# Patient Record
Sex: MALE | Race: WHITE | Employment: FULL TIME | URBAN - METROPOLITAN AREA
[De-identification: names, ages, dates, MRNs, and addresses within clinical notes are randomized per-mention and may not be internally consistent; named-entity substitution may affect disease eponyms.]

---

## 2017-11-03 LAB
BASOPHILS ABSOLUTE: NORMAL /ΜL
BASOPHILS RELATIVE PERCENT: NORMAL %
EOSINOPHILS ABSOLUTE: NORMAL /ΜL
EOSINOPHILS RELATIVE PERCENT: NORMAL %
HCT VFR BLD CALC: NORMAL % (ref 41–53)
HEMOGLOBIN: NORMAL G/DL (ref 13.5–17.5)
LYMPHOCYTES ABSOLUTE: NORMAL /ΜL
LYMPHOCYTES RELATIVE PERCENT: NORMAL %
MCH RBC QN AUTO: NORMAL PG
MCHC RBC AUTO-ENTMCNC: NORMAL G/DL
MCV RBC AUTO: NORMAL FL
MONOCYTES ABSOLUTE: NORMAL /ΜL
MONOCYTES RELATIVE PERCENT: NORMAL %
NEUTROPHILS ABSOLUTE: NORMAL /ΜL
NEUTROPHILS RELATIVE PERCENT: NORMAL %
PLATELET # BLD: NORMAL K/ΜL
PMV BLD AUTO: NORMAL FL
RBC # BLD: NORMAL 10^6/ΜL
WBC # BLD: NORMAL 10^3/ML

## 2017-12-20 ENCOUNTER — TELEPHONE (OUTPATIENT)
Dept: INFECTIOUS DISEASES | Age: 52
End: 2017-12-20

## 2017-12-20 DIAGNOSIS — B20 HIV (HUMAN IMMUNODEFICIENCY VIRUS INFECTION) (HCC): Primary | ICD-10-CM

## 2017-12-20 NOTE — TELEPHONE ENCOUNTER
PATIENT CALLED AND NEEDS 4 MEDICATIONS REFILLED. HE HOWEVER DID NOT HAVE THE SIG FOR THEM. AND I CAN'T LOOK IN OLD E-SCRIBE ANYMORE. .. IF YOU COULD IN ORDER IN .       HE NEEDS:    INSENTRESS 400MG  NORVIR 100MG  PREZISTA 600MG   SELZENTRY 150MG    THANKS!!!!!

## 2017-12-21 RX ORDER — MARAVIROC 300 MG/1
300 TABLET, FILM COATED ORAL 2 TIMES DAILY
Qty: 60 TABLET | Refills: 5 | Status: SHIPPED | OUTPATIENT
Start: 2017-12-21 | End: 2017-12-22 | Stop reason: CLARIF

## 2017-12-22 RX ORDER — MARAVIROC 150 MG/1
150 TABLET, FILM COATED ORAL 2 TIMES DAILY
Qty: 180 TABLET | Refills: 1 | Status: SHIPPED | OUTPATIENT
Start: 2017-12-22 | End: 2018-10-10 | Stop reason: SDUPTHER

## 2017-12-22 NOTE — TELEPHONE ENCOUNTER
Freeman Arshad w/[facility] called, wrong RX was sent in. I cancelled the 300mg and pended the order for:       Selzentry 150mg, 1 tablet twice a day, #180, 1 refill    Please sign for the 150mg. Thank you.

## 2018-01-09 ENCOUNTER — OFFICE VISIT (OUTPATIENT)
Dept: INFECTIOUS DISEASES | Age: 53
End: 2018-01-09
Payer: COMMERCIAL

## 2018-01-09 VITALS
BODY MASS INDEX: 28.73 KG/M2 | WEIGHT: 178.8 LBS | SYSTOLIC BLOOD PRESSURE: 135 MMHG | HEIGHT: 66 IN | HEART RATE: 70 BPM | DIASTOLIC BLOOD PRESSURE: 73 MMHG | OXYGEN SATURATION: 95 % | RESPIRATION RATE: 16 BRPM

## 2018-01-09 DIAGNOSIS — B20 HIV (HUMAN IMMUNODEFICIENCY VIRUS INFECTION) (HCC): Primary | ICD-10-CM

## 2018-01-09 PROCEDURE — 99214 OFFICE O/P EST MOD 30 MIN: CPT | Performed by: INTERNAL MEDICINE

## 2018-01-09 RX ORDER — TRAZODONE HYDROCHLORIDE 100 MG/1
1 TABLET ORAL DAILY
COMMUNITY
Start: 2017-12-08

## 2018-01-09 RX ORDER — TADALAFIL 10 MG
1 TABLET ORAL PRN
COMMUNITY
Start: 2017-10-25

## 2018-01-09 RX ORDER — LISINOPRIL 10 MG/1
1 TABLET ORAL DAILY
COMMUNITY
Start: 2017-12-22

## 2018-01-09 RX ORDER — ACETAMINOPHEN AND CODEINE PHOSPHATE 300; 30 MG/1; MG/1
1-2 TABLET ORAL PRN
COMMUNITY
Start: 2017-05-24

## 2018-01-09 ASSESSMENT — ENCOUNTER SYMPTOMS
ABDOMINAL PAIN: 0
EYE DISCHARGE: 0
VOICE CHANGE: 0
ABDOMINAL DISTENTION: 0
NAUSEA: 0
FACIAL SWELLING: 0
RHINORRHEA: 0
SHORTNESS OF BREATH: 0
TROUBLE SWALLOWING: 0
BACK PAIN: 0
COUGH: 0
DIARRHEA: 0
SORE THROAT: 0

## 2018-01-09 NOTE — LETTER
[facility] [address] [place] [id number]  Phone: [phone]  Fax: [phone]    PCP: Niharika Santos MD   [address] [place] [address] providers:  Niharika Santos, [address] [facility]  [address] [place] [address] [address]: [phone]       January 18, 2018       Patient: Amalia Muñoz   MR Number: [id number]   YOB: 1965   Date of Visit: 1/9/2018     Dear Whitney Watts: Thank you for allowing me to see your patient Mr. Amalia Muñoz. Below are the relevant portions of my assessment and plan of care. [facility] INFECTIOUS DISEASES  Follow-Up Note      Patient's Name: Amalia Muñoz  YOB: 1965  Age/Sex: 45 y.o./ male  Physician: Jerrell Shukla MD  Date of Consult: 1/9/2018     MD Lele Goyal MD Michalene Retort, MD Chalmer Brash, MD     Assessment:     · HIV CD4 494 from 80 with negative viral load early last year  · Hemophilia A  · Hypertension  · Dyslipidemia  · Chronic kidney disease  · History of hepatitis C status post treatment  · ORIF right femur fracture and repeat surgery for infection - cured      Plan:     · Prezista, Norvir, Truvada, and Selzentry  · Regular blood draw  · CD4 and viral load this visit because of the decline in CD4  · Follow-up in May 2018          F/u of: HIV/Hep C     No chief complaint on file. Interval History:  Amalia Muñoz is a 52-year-old male with a medical history significant for treated Hep C and HIV on HAART. He has hemophilia A and acquired HIV through blood transfusion. He takes his medication regularly which consist of Prezista, Norvir, Truvada, and Selzentry. He had a viral blip in 2016 but on repeat monitoring it came back negative. He is to follow up today. He denies of any fever, chills or sweats. He continues to work. He denies major bleeding episodes. He denies any GI or  symptoms. His appetite is about the same and his energy level is good. November 13, 2017 done at [facility]  CD4 is 492  WBC 6.8 and hematocrit 13.8 hematocrit 43.5 and platelet count 614  We will obtain a viral load      IMAGING: None new    Thank you. Wilmer Stewart MD  1/9/2018  4:57 PM    [facility]    If you have questions, please do not hesitate to call me. I look forward to following Benny Camacho along with you.     Sincerely,    MD Wilmer Dumont MD

## 2018-01-09 NOTE — PROGRESS NOTES
Respiratory: Negative for cough and shortness of breath. Cardiovascular: Negative for chest pain, palpitations and leg swelling. Gastrointestinal: Negative for abdominal distention, abdominal pain, constipation, diarrhea and nausea. Genitourinary: Negative for dysuria, frequency and hematuria. Musculoskeletal: Positive for arthralgias. Negative for back pain, gait problem, joint swelling, myalgias, neck pain and neck stiffness. Occasional joint bleeding   Skin: Negative for color change, pallor, rash and wound. Allergic/Immunologic: Negative for environmental allergies, food allergies and immunocompromised state. Neurological: Negative for dizziness, tremors, seizures, syncope, facial asymmetry, speech difficulty, weakness, light-headedness, numbness and headaches. Hematological: Does not bruise/bleed easily. Psychiatric/Behavioral: Negative for agitation. Current Outpatient Prescriptions:     acetaminophen-codeine (TYLENOL #3) 300-30 MG per tablet, Take 1-2 tablets by mouth as needed. , Disp: , Rfl:     vitamin D 1000 units CAPS, Take 1,000 Units by mouth daily, Disp: , Rfl:     lisinopril (PRINIVIL;ZESTRIL) 10 MG tablet, Take 1 tablet by mouth daily, Disp: , Rfl:     traZODone (DESYREL) 100 MG tablet, Take 1 tablet by mouth daily, Disp: , Rfl:     CIALIS 10 MG tablet, Take 1 tablet by mouth as needed, Disp: , Rfl:     maraviroc (SELZENTRY) 150 MG tablet, Take 1 tablet by mouth 2 times daily, Disp: 180 tablet, Rfl: 1    darunavir ethanolate (PREZISTA) 800 MG TABS, Take 1 tablet by mouth daily, Disp: 30 tablet, Rfl: 5    ritonavir (NORVIR) 100 MG capsule, Take 1 capsule by mouth daily, Disp: 30 capsule, Rfl: 5    raltegravir (ISENTRESS) 400 MG tablet, Take 1 tablet by mouth 2 times daily, Disp: 60 tablet, Rfl: 3    Past Medical History:   Diagnosis Date    CKD (chronic kidney disease)     Dyslipidemia     Femur fracture (HCC)     Hemophilia (HCC)     Hepatitis C    

## 2018-01-18 ASSESSMENT — ENCOUNTER SYMPTOMS
COLOR CHANGE: 0
PHOTOPHOBIA: 0
SINUS PAIN: 0
CONSTIPATION: 0
SINUS PRESSURE: 0

## 2018-04-23 ENCOUNTER — TELEPHONE (OUTPATIENT)
Dept: INFECTIOUS DISEASES | Age: 53
End: 2018-04-23

## 2018-05-15 ENCOUNTER — OFFICE VISIT (OUTPATIENT)
Dept: INFECTIOUS DISEASES | Age: 53
End: 2018-05-15
Payer: COMMERCIAL

## 2018-05-15 VITALS
HEART RATE: 64 BPM | TEMPERATURE: 98.4 F | OXYGEN SATURATION: 94 % | WEIGHT: 178 LBS | SYSTOLIC BLOOD PRESSURE: 127 MMHG | DIASTOLIC BLOOD PRESSURE: 73 MMHG | BODY MASS INDEX: 28.61 KG/M2 | RESPIRATION RATE: 16 BRPM | HEIGHT: 66 IN

## 2018-05-15 DIAGNOSIS — B20 HIV (HUMAN IMMUNODEFICIENCY VIRUS INFECTION) (HCC): Primary | ICD-10-CM

## 2018-05-15 PROCEDURE — 99214 OFFICE O/P EST MOD 30 MIN: CPT | Performed by: INTERNAL MEDICINE

## 2018-05-15 ASSESSMENT — ENCOUNTER SYMPTOMS
NAUSEA: 0
FACIAL SWELLING: 0
BACK PAIN: 0
EYE DISCHARGE: 0
ABDOMINAL DISTENTION: 0
SORE THROAT: 0
VOICE CHANGE: 0
SHORTNESS OF BREATH: 0
COUGH: 0
DIARRHEA: 0
RHINORRHEA: 0
ABDOMINAL PAIN: 0
TROUBLE SWALLOWING: 0

## 2018-05-15 NOTE — LETTER
[facility] [address] [place] [address]  Phone: [phone]  Fax: [phone]    PCP: Maryam Hernandez MD   [address] [place] [address] providers:  Maryam Hernandez, [address] [facility]  [address] [place] [address] [address]: [phone]       July 26, 2018       Patient: Naty Wallace   MR Number: [id number]   YOB: 1965   Date of Visit: 5/15/2018     Dear Emile Montes: Thank you for allowing me to see your patient Mr. Naty Wallace. Below are the relevant portions of my assessment and plan of care. [facility] INFECTIOUS DISEASES  Follow-Up Note      Patient's Name: Naty Wallace  YOB: 1965  Age/Sex: 45 y.o./ male  Physician: Melina Valverde MD  Date of Consult: 5/15/2018          Assessment:     · HIV CD4 421 from 494 from 80 with negative viral load . Last check was in April 20, 2018. · Hemophilia Ano major bleeding episodes. He continues to receive his factor VIII  · Hypertension not under control  · Dyslipidemia  · Chronic kidney disease is relatively stable  · History of hepatitis C status post treatment  · ORIF right femur fracture and repeat surgery for infection - cured      Plan:     · Prezista, Norvir, Truvada, and Selzentry  · Regular blood draw  · And severe decline in his CD4 but he has a viral load that is undetectable. · Unclear as to the etiology of his CD4 decline because his symptoms to be doing well and his viral load is undetectable.             F/u of : HIV and hepatitic C    Chief Complaint   Patient presents with    HIV Positive/AIDS     follow up        HISTORY OF PRESENT ILLNESS:  Naty Wallace is a 73-year-old male with a medical history significant for treated Hep C and HIV on HAART. He has hemophilia A and acquired HIV through blood transfusion. He takes his medication regularly which consist of Prezista, Norvir, Truvada, and Selzentry.   He had a viral blip in 2016 but on repeat monitoring it came back negative. INTERVAL HISTORY:  The patient is here for follow-up today. He said he is doing well. He denies having any fever,  chills or sweats. He continues to work. He denies major bleeding episodes. He denies any GI or  symptoms. His appetite is about the same and his energy level is good. His weight is about the same. His factor VIII transfusions twice a week. Subjective:     Review of Systems   Constitutional: Negative for activity change, chills and fever. HENT: Negative for congestion, facial swelling, hearing loss, rhinorrhea, sore throat, trouble swallowing and voice change. Eyes: Negative for discharge and visual disturbance. Respiratory: Negative for cough and shortness of breath. Cardiovascular: Negative for chest pain and leg swelling. Gastrointestinal: Negative for abdominal distention, abdominal pain, diarrhea and nausea. Genitourinary: Negative for dysuria, frequency and hematuria. Musculoskeletal: Negative for arthralgias, back pain, gait problem, joint swelling, myalgias, neck pain and neck stiffness. Allergic/Immunologic: Negative for environmental allergies and food allergies. Neurological: Negative for dizziness, weakness, numbness and headaches. Hematological: Does not bruise/bleed easily. Current Outpatient Prescriptions:     acetaminophen-codeine (TYLENOL #3) 300-30 MG per tablet, Take 1-2 tablets by mouth as needed. , Disp: , Rfl:     vitamin D 1000 units CAPS, Take 1,000 Units by mouth daily, Disp: , Rfl:     lisinopril (PRINIVIL;ZESTRIL) 10 MG tablet, Take 1 tablet by mouth daily, Disp: , Rfl:     traZODone (DESYREL) 100 MG tablet, Take 1 tablet by mouth daily, Disp: , Rfl:     CIALIS 10 MG tablet, Take 1 tablet by mouth as needed, Disp: , Rfl:     maraviroc (SELZENTRY) 150 MG tablet, Take 1 tablet by mouth 2 times daily, Disp: 180 tablet, Rfl: 1   darunavir ethanolate (PREZISTA) 800 MG TABS, Take 1 tablet by mouth daily, Disp: 30 tablet, Rfl: 5    ritonavir (NORVIR) 100 MG capsule, Take 1 capsule by mouth daily, Disp: 30 capsule, Rfl: 5    raltegravir (ISENTRESS) 400 MG tablet, Take 1 tablet by mouth 2 times daily, Disp: 60 tablet, Rfl: 3    Past Medical History:   Diagnosis Date    CKD (chronic kidney disease)     Dyslipidemia     Femur fracture (HCC)     [facility] ([facility]     Hepatitis C     HIV (human immunodeficiency virus infection) ([facility]     Hypertension        Past Surgical History:   Procedure Laterality Date    FEMUR FRACTURE SURGERY      Right        Allergies   Allergen Reactions    Aspirin     Ceclor [Cefaclor]     Nsaids     Paxil [Paroxetine]        History reviewed. No pertinent family history. Social History     Social History    Marital status:      Spouse name: N/A    Number of children: N/A    Years of education: N/A     Social History Main Topics    Smoking status: Never Smoker    Smokeless tobacco: Never Used    Alcohol use No    Drug use: No    Sexual activity: Not Asked     Other Topics Concern    None     Social History Narrative    None         Objective:   /73 (Site: Left Arm, Position: Sitting, Cuff Size: Large Adult)   Pulse 64   Temp 98.4 °F (36.9 °C) (Oral)   Resp 16   Ht 5' 6\" (1.676 m)   Wt 178 lb (80.7 kg)   SpO2 94%   BMI 28.73 kg/m²    Physical Exam   Constitutional: He is oriented to person, place, and time. He appears well-developed and well-nourished. HENT:   Head: Normocephalic and atraumatic. Right Ear: External ear normal.   Left Ear: External ear normal.   Nose: Nose normal.   Mouth/Throat: Oropharynx is clear and moist. No oropharyngeal exudate. Eyes: Conjunctivae and EOM are normal. Pupils are equal, round, and reactive to light. Right eye exhibits no discharge. Left eye exhibits no discharge. No scleral icterus.

## 2018-05-15 NOTE — PROGRESS NOTES
sore throat, trouble swallowing and voice change. Eyes: Negative for discharge and visual disturbance. Respiratory: Negative for cough and shortness of breath. Cardiovascular: Negative for chest pain and leg swelling. Gastrointestinal: Negative for abdominal distention, abdominal pain, diarrhea and nausea. Genitourinary: Negative for dysuria, frequency and hematuria. Musculoskeletal: Negative for arthralgias, back pain, gait problem, joint swelling, myalgias, neck pain and neck stiffness. Allergic/Immunologic: Negative for environmental allergies and food allergies. Neurological: Negative for dizziness, weakness, numbness and headaches. Hematological: Does not bruise/bleed easily. Current Outpatient Prescriptions:     acetaminophen-codeine (TYLENOL #3) 300-30 MG per tablet, Take 1-2 tablets by mouth as needed. , Disp: , Rfl:     vitamin D 1000 units CAPS, Take 1,000 Units by mouth daily, Disp: , Rfl:     lisinopril (PRINIVIL;ZESTRIL) 10 MG tablet, Take 1 tablet by mouth daily, Disp: , Rfl:     traZODone (DESYREL) 100 MG tablet, Take 1 tablet by mouth daily, Disp: , Rfl:     CIALIS 10 MG tablet, Take 1 tablet by mouth as needed, Disp: , Rfl:     maraviroc (SELZENTRY) 150 MG tablet, Take 1 tablet by mouth 2 times daily, Disp: 180 tablet, Rfl: 1    darunavir ethanolate (PREZISTA) 800 MG TABS, Take 1 tablet by mouth daily, Disp: 30 tablet, Rfl: 5    ritonavir (NORVIR) 100 MG capsule, Take 1 capsule by mouth daily, Disp: 30 capsule, Rfl: 5    raltegravir (ISENTRESS) 400 MG tablet, Take 1 tablet by mouth 2 times daily, Disp: 60 tablet, Rfl: 3    Past Medical History:   Diagnosis Date    CKD (chronic kidney disease)     Dyslipidemia     Femur fracture (HCC)     [facility]     Hepatitis C     HIV (human immunodeficiency virus infection) ([facility]     Hypertension        Past Surgical History:   Procedure Laterality Date    FEMUR FRACTURE SURGERY      Right        Allergies Allergen Reactions    Aspirin     Ceclor [Cefaclor]     Nsaids     Paxil [Paroxetine]        History reviewed. No pertinent family history. Social History     Social History    Marital status:      Spouse name: N/A    Number of children: N/A    Years of education: N/A     Social History Main Topics    Smoking status: Never Smoker    Smokeless tobacco: Never Used    Alcohol use No    Drug use: No    Sexual activity: Not Asked     Other Topics Concern    None     Social History Narrative    None         Objective:   /73 (Site: Left Arm, Position: Sitting, Cuff Size: Large Adult)   Pulse 64   Temp 98.4 °F (36.9 °C) (Oral)   Resp 16   Ht 5' 6\" (1.676 m)   Wt 178 lb (80.7 kg)   SpO2 94%   BMI 28.73 kg/m²   Physical Exam   Constitutional: He is oriented to person, place, and time. He appears well-developed and well-nourished. HENT:   Head: Normocephalic and atraumatic. Right Ear: External ear normal.   Left Ear: External ear normal.   Nose: Nose normal.   Mouth/Throat: Oropharynx is clear and moist. No oropharyngeal exudate. Eyes: Conjunctivae and EOM are normal. Pupils are equal, round, and reactive to light. Right eye exhibits no discharge. Left eye exhibits no discharge. No scleral icterus. Neck: Neck supple. No JVD present. No tracheal deviation present. Cardiovascular: Normal rate, regular rhythm, normal heart sounds and intact distal pulses. No murmur heard. Pulmonary/Chest: Effort normal and breath sounds normal. No stridor. He has no wheezes. He has no rales. Abdominal: Soft. Bowel sounds are normal. He exhibits no distension. There is no tenderness. Musculoskeletal: He exhibits no edema. Bony prominences are stable related to osteoarthritis. The patient does have some degree of atrophy of the lower extremities. Lymphadenopathy:     He has no cervical adenopathy. Neurological: He is alert and oriented to person, place, and time.  He has normal

## 2018-10-10 NOTE — TELEPHONE ENCOUNTER
Dr Michelle Neville, patient is current but not scheduled for an appointment at this time due to you not being in the office. I sent a note to pharmacy asking that patient call for an appointment. Per your last dictation, Prezista, Norvir, Truvada and Selzentry. No mention of Isentress but you have prescribed in the past.     Please sign for refills that are appropriate. Thank you.

## 2018-10-19 RX ORDER — MARAVIROC 150 MG
TABLET ORAL
Qty: 180 TABLET | Refills: 0 | Status: SHIPPED | OUTPATIENT
Start: 2018-10-19 | End: 2021-02-24 | Stop reason: ALTCHOICE

## 2018-10-19 RX ORDER — RALTEGRAVIR 400 MG/1
TABLET, FILM COATED ORAL
Qty: 180 TABLET | Refills: 0 | Status: SHIPPED | OUTPATIENT
Start: 2018-10-19

## 2018-10-19 RX ORDER — DARUNAVIR 800 MG/1
TABLET, FILM COATED ORAL
Qty: 90 TABLET | Refills: 0 | Status: SHIPPED | OUTPATIENT
Start: 2018-10-19 | End: 2019-12-18

## 2018-11-07 RX ORDER — RITONAVIR 100 MG/1
TABLET ORAL
Qty: 90 TABLET | Refills: 3 | Status: SHIPPED | OUTPATIENT
Start: 2018-11-07 | End: 2019-09-19 | Stop reason: SDUPTHER

## 2018-12-19 ENCOUNTER — OFFICE VISIT (OUTPATIENT)
Dept: INFECTIOUS DISEASES | Age: 53
End: 2018-12-19
Payer: COMMERCIAL

## 2018-12-19 VITALS
RESPIRATION RATE: 14 BRPM | HEIGHT: 66 IN | TEMPERATURE: 98.1 F | OXYGEN SATURATION: 97 % | WEIGHT: 177 LBS | DIASTOLIC BLOOD PRESSURE: 81 MMHG | HEART RATE: 72 BPM | SYSTOLIC BLOOD PRESSURE: 128 MMHG | BODY MASS INDEX: 28.45 KG/M2

## 2018-12-19 DIAGNOSIS — Z86.19 HX OF HEPATITIS C: ICD-10-CM

## 2018-12-19 DIAGNOSIS — B20 HIV (HUMAN IMMUNODEFICIENCY VIRUS INFECTION) (HCC): Primary | ICD-10-CM

## 2018-12-19 PROCEDURE — 99214 OFFICE O/P EST MOD 30 MIN: CPT | Performed by: INTERNAL MEDICINE

## 2018-12-19 RX ORDER — RITONAVIR 100 MG/1
100 TABLET ORAL DAILY
Qty: 90 EACH | Refills: 4 | Status: SHIPPED | OUTPATIENT
Start: 2018-12-19 | End: 2019-09-05 | Stop reason: SDUPTHER

## 2018-12-19 RX ORDER — MARAVIROC 150 MG/1
150 TABLET, FILM COATED ORAL 2 TIMES DAILY
Qty: 180 TABLET | Refills: 4 | Status: SHIPPED | OUTPATIENT
Start: 2018-12-19 | End: 2019-09-05 | Stop reason: SDUPTHER

## 2018-12-19 RX ORDER — EMTRICITABINE AND TENOFOVIR DISOPROXIL FUMARATE 200; 300 MG/1; MG/1
1 TABLET, FILM COATED ORAL DAILY
Qty: 90 TABLET | Refills: 4 | Status: SHIPPED | OUTPATIENT
Start: 2018-12-19 | End: 2021-02-24 | Stop reason: ALTCHOICE

## 2018-12-19 ASSESSMENT — ENCOUNTER SYMPTOMS
RESPIRATORY NEGATIVE: 1
ALLERGIC/IMMUNOLOGIC NEGATIVE: 1
GASTROINTESTINAL NEGATIVE: 1
EYES NEGATIVE: 1

## 2018-12-19 NOTE — PROGRESS NOTES
affairs, no history of STDs,  Blood work reviewed within normal range, CD4 count has increased back to 641 with 32% and viral load negative, complete metabolic profile and CBC within normal range. I have personally reviewed the past medical history, past surgical history, medications, social history, and family history, and I haveupdated the database accordingly. Past Medical History:     Past Medical History:   Diagnosis Date    CKD (chronic kidney disease)     Dyslipidemia     Femur fracture (HCC)     [facility]     Hepatitis C     [facility]     Hypertension        Past Surgical  History:     Past Surgical History:   Procedure Laterality Date    FEMUR FRACTURE SURGERY      Right        Medications:     Current Outpatient Prescriptions:     darunavir ethanolate (PREZISTA) 800 MG TABS, Take 1 tablet by mouth daily, Disp: 90 tablet, Rfl: 4    ritonavir (NORVIR) 100 MG tablet, Take 1 tablet by mouth daily, Disp: 90 each, Rfl: 4    emtricitabine-tenofovir (TRUVADA) 200-300 MG per tablet, Take 1 tablet by mouth daily, Disp: 90 tablet, Rfl: 4    maraviroc (SELZENTRY) 150 MG tablet, Take 1 tablet by mouth 2 times daily, Disp: 180 tablet, Rfl: 4    ritonavir (NORVIR) 100 MG tablet, TAKE 1 TABLET DAILY, Disp: 90 tablet, Rfl: 3    PREZISTA 800 MG TABS, TAKE 1 TABLET DAILY, Disp: 90 tablet, Rfl: 0    SELZENTRY 150 MG tablet, TAKE 1 TABLET TWICE A DAY, Disp: 180 tablet, Rfl: 0    ISENTRESS 400 MG tablet, TAKE 1 TABLET TWICE A DAY, Disp: 180 tablet, Rfl: 0    acetaminophen-codeine (TYLENOL #3) 300-30 MG per tablet, Take 1-2 tablets by mouth as needed. , Disp: , Rfl:     vitamin D 1000 units CAPS, Take 1,000 Units by mouth daily, Disp: , Rfl:     lisinopril (PRINIVIL;ZESTRIL) 10 MG tablet, Take 1 tablet by mouth daily, Disp: , Rfl:     traZODone (DESYREL) 100 MG tablet, Take 1 tablet by mouth daily, Disp: , Rfl:     CIALIS 10 MG tablet, Take 1 tablet by

## 2019-09-05 DIAGNOSIS — B20 HIV (HUMAN IMMUNODEFICIENCY VIRUS INFECTION) (HCC): ICD-10-CM

## 2019-09-05 LAB
ALBUMIN SERPL-MCNC: NORMAL G/DL
ALP BLD-CCNC: NORMAL U/L
ALT SERPL-CCNC: NORMAL U/L
ANION GAP SERPL CALCULATED.3IONS-SCNC: NORMAL MMOL/L
AST SERPL-CCNC: NORMAL U/L
BILIRUB SERPL-MCNC: NORMAL MG/DL (ref 0.1–1.4)
BUN BLDV-MCNC: NORMAL MG/DL
CALCIUM SERPL-MCNC: NORMAL MG/DL
CHLORIDE BLD-SCNC: NORMAL MMOL/L
CHOLESTEROL, FASTING: NORMAL
CO2: NORMAL MMOL/L
CREAT SERPL-MCNC: NORMAL MG/DL
GFR CALCULATED: NORMAL
GLUCOSE BLD-MCNC: NORMAL MG/DL
HDLC SERPL-MCNC: NORMAL MG/DL (ref 35–70)
LDL CHOLESTEROL CALCULATED: NORMAL MG/DL (ref 0–160)
LIPASE: NORMAL UNITS/L
POTASSIUM SERPL-SCNC: NORMAL MMOL/L
SODIUM BLD-SCNC: NORMAL MMOL/L
TOTAL PROTEIN: NORMAL
TRIGLYCERIDE, FASTING: NORMAL

## 2019-09-05 NOTE — TELEPHONE ENCOUNTER
Patient is requesting refills of his medications, all but Truvada. He has plenty of that. Chente Nesbitt He only has 5 days worth left. I verified the new pharmacy. Please sign off on them.  Thank you

## 2019-09-10 RX ORDER — RITONAVIR 100 MG/1
100 TABLET ORAL DAILY
Qty: 90 EACH | Refills: 3 | Status: SHIPPED | OUTPATIENT
Start: 2019-09-10 | End: 2019-12-18 | Stop reason: SDUPTHER

## 2019-09-10 RX ORDER — MARAVIROC 150 MG/1
150 TABLET, FILM COATED ORAL 2 TIMES DAILY
Qty: 180 TABLET | Refills: 3 | Status: SHIPPED | OUTPATIENT
Start: 2019-09-10 | End: 2019-12-18 | Stop reason: SDUPTHER

## 2019-09-11 DIAGNOSIS — Z86.19 HX OF HEPATITIS C: ICD-10-CM

## 2019-09-11 DIAGNOSIS — B20 HIV (HUMAN IMMUNODEFICIENCY VIRUS INFECTION) (HCC): ICD-10-CM

## 2019-09-12 NOTE — TELEPHONE ENCOUNTER
Patient called and said the pharmacy didn't receive our scripts so I phoned them in. They will call the patient and put a rush on the medications.

## 2019-09-14 DIAGNOSIS — R79.89 BLOOD TRIGLYCERIDES INCREASED COMPARED WITH PRIOR MEASUREMENT: Primary | ICD-10-CM

## 2019-09-19 ENCOUNTER — OFFICE VISIT (OUTPATIENT)
Dept: INFECTIOUS DISEASES | Age: 54
End: 2019-09-19
Payer: COMMERCIAL

## 2019-09-19 VITALS
HEART RATE: 73 BPM | TEMPERATURE: 97.6 F | BODY MASS INDEX: 27.83 KG/M2 | WEIGHT: 173.2 LBS | SYSTOLIC BLOOD PRESSURE: 135 MMHG | OXYGEN SATURATION: 96 % | DIASTOLIC BLOOD PRESSURE: 80 MMHG | HEIGHT: 66 IN

## 2019-09-19 DIAGNOSIS — Z21 HIV INFECTION, ASYMPTOMATIC (HCC): Primary | ICD-10-CM

## 2019-09-19 DIAGNOSIS — E78.1 HIGH TRIGLYCERIDES: ICD-10-CM

## 2019-09-19 DIAGNOSIS — R79.89 BLOOD TRIGLYCERIDES INCREASED COMPARED WITH PRIOR MEASUREMENT: ICD-10-CM

## 2019-09-19 PROCEDURE — 99214 OFFICE O/P EST MOD 30 MIN: CPT | Performed by: INTERNAL MEDICINE

## 2019-09-19 ASSESSMENT — ENCOUNTER SYMPTOMS
DIARRHEA: 0
GASTROINTESTINAL NEGATIVE: 1
CHOKING: 0
RESPIRATORY NEGATIVE: 1
EYES NEGATIVE: 1
ABDOMINAL PAIN: 0
ALLERGIC/IMMUNOLOGIC NEGATIVE: 1

## 2019-09-19 NOTE — PROGRESS NOTES
BILITOT, ALKPHOS, ALT, AST  No results found for: RPR  No results found for: HIV  No results found for: BC  No results found for: EPICELLS, MUCUS, PH, RBC, TRICHOMONAS, WBC, YEAST, TURBIDITY  No results found for: CREATININE, GLUCOSE, KETONES  Thank you for allowing us to participate in the care of this patient. Please call with questions. Georganne Olszewski, MD  - Office: [phone]    Please note that this chart was generated using voice recognition Dragon dictation software. Although every effort was made to ensure the accuracy of this automated transcription, some errors in transcription mayhave occurred.

## 2019-11-25 DIAGNOSIS — Z21 HIV INFECTION, ASYMPTOMATIC (HCC): Primary | ICD-10-CM

## 2019-12-07 LAB
CHOLESTEROL, TOTAL: NORMAL
CHOLESTEROL/HDL RATIO: NORMAL
HDLC SERPL-MCNC: NORMAL MG/DL
LDL CHOLESTEROL CALCULATED: NORMAL
TRIGL SERPL-MCNC: NORMAL MG/DL
VLDLC SERPL CALC-MCNC: NORMAL MG/DL

## 2019-12-13 ENCOUNTER — TELEPHONE (OUTPATIENT)
Dept: INFECTIOUS DISEASES | Age: 54
End: 2019-12-13

## 2019-12-13 DIAGNOSIS — Z21 HIV INFECTION, ASYMPTOMATIC (HCC): ICD-10-CM

## 2019-12-16 DIAGNOSIS — Z21 HIV INFECTION, ASYMPTOMATIC (HCC): ICD-10-CM

## 2019-12-18 ENCOUNTER — OFFICE VISIT (OUTPATIENT)
Dept: INFECTIOUS DISEASES | Age: 54
End: 2019-12-18
Payer: COMMERCIAL

## 2019-12-18 VITALS
SYSTOLIC BLOOD PRESSURE: 131 MMHG | WEIGHT: 178.9 LBS | OXYGEN SATURATION: 99 % | TEMPERATURE: 97.8 F | HEART RATE: 87 BPM | DIASTOLIC BLOOD PRESSURE: 68 MMHG | BODY MASS INDEX: 28.88 KG/M2

## 2019-12-18 DIAGNOSIS — Z21 ASYMPTOMATIC HIV INFECTION (HCC): Primary | ICD-10-CM

## 2019-12-18 PROCEDURE — 99214 OFFICE O/P EST MOD 30 MIN: CPT | Performed by: INTERNAL MEDICINE

## 2019-12-18 ASSESSMENT — ENCOUNTER SYMPTOMS
CHOKING: 0
DIARRHEA: 0
RESPIRATORY NEGATIVE: 1
ABDOMINAL PAIN: 0
EYES NEGATIVE: 1
GASTROINTESTINAL NEGATIVE: 1
ALLERGIC/IMMUNOLOGIC NEGATIVE: 1

## 2020-01-14 ENCOUNTER — TELEPHONE (OUTPATIENT)
Dept: INFECTIOUS DISEASES | Age: 55
End: 2020-01-14

## 2020-01-17 NOTE — TELEPHONE ENCOUNTER
Attempted to call patient yesterday and LMOM, I also called patient again this morning.  I will attempt to reach Emergency Contact this afternoon

## 2020-03-17 RX ORDER — DARUNAVIR 800 MG/1
TABLET, FILM COATED ORAL
Qty: 90 TABLET | Refills: 1 | Status: SHIPPED | OUTPATIENT
Start: 2020-03-17 | End: 2020-09-08

## 2020-06-24 ENCOUNTER — OFFICE VISIT (OUTPATIENT)
Dept: INFECTIOUS DISEASES | Age: 55
End: 2020-06-24
Payer: COMMERCIAL

## 2020-06-24 VITALS
RESPIRATION RATE: 18 BRPM | HEART RATE: 96 BPM | BODY MASS INDEX: 28.03 KG/M2 | DIASTOLIC BLOOD PRESSURE: 77 MMHG | SYSTOLIC BLOOD PRESSURE: 125 MMHG | TEMPERATURE: 97.9 F | WEIGHT: 174.4 LBS | HEIGHT: 66 IN | OXYGEN SATURATION: 98 %

## 2020-06-24 PROCEDURE — 99213 OFFICE O/P EST LOW 20 MIN: CPT | Performed by: INTERNAL MEDICINE

## 2020-06-24 PROCEDURE — 90471 IMMUNIZATION ADMIN: CPT | Performed by: INTERNAL MEDICINE

## 2020-06-24 PROCEDURE — 90670 PCV13 VACCINE IM: CPT | Performed by: INTERNAL MEDICINE

## 2020-06-24 ASSESSMENT — ENCOUNTER SYMPTOMS
CHOKING: 0
ABDOMINAL PAIN: 0
GASTROINTESTINAL NEGATIVE: 1
RESPIRATORY NEGATIVE: 1
DIARRHEA: 0
EYES NEGATIVE: 1
ALLERGIC/IMMUNOLOGIC NEGATIVE: 1

## 2020-06-24 NOTE — PATIENT INSTRUCTIONS
We gave you today 6/24 the Prevnar 13  You will be due to take the tetanus vaccine for adult DTaP at your local pharmacy  You will need otherwise Pneumovax 23 every 5 to 7 years probably due in about 2021

## 2020-06-24 NOTE — PROGRESS NOTES
CBC With Auto Differential    Lipase    PREVNAR 13 IM (Pneumococcal conjugate vaccine 13-valent)       Return in about 8 months (around 2/24/2021). History of Present Illness:   Glenn Bailey is a 62y.o.-year-old  male who presents with   Chief Complaint   Patient presents with    HIV   Visit of 12/19/18  This is a gentleman who has been following with Dr. Latosha Paredes for HIV 1, for many years now, has had hemophilia A, on factor VIII, had multiple transfusions since he was young, leading to the HIV superinfection, hepatitis C, post treatment with viral load negative since 2014, CK-MB, stable, his CD4 count has been within a 400-600 range on boosted darunavir and Truvada regimen with maraviroc. He did have a right femoral fracture in 2010, leading to  bleed and having to remove the hardware then replacing it for MRSA infection. Currently he is not o any suppressive therapy, his right thigh is atrophic, but she can ambulate. He is , has no other extramarital sexual affairs, no history of STDs,  Blood work reviewed within normal range, CD4 count has increased back to 641 with 32% and viral load negative, complete metabolic profile and CBC within normal range. Visit 9/19/19  Feels great and no fever - no chest pain or diarrhea - no etoh and no risky sex exposure -  -  Hep C neg PCR AND HIV VL not detected, CD4 lower at 390 and 27%. trigleride is400 range and card risk 6.6, elevated w low HDL. Per pt he had a higher triglyceride in past on other HAART, never been on anti lipid meds. Taking his meds regularly and no skipping. Had some joint small bleeds, following w ortho. Exam neg     Visit 6/24/20  Doing great today, he still has on and off bleeds in his right elbow and both ankles from his hemophilia, and is planning to go on Hemlibra is a new medication for that. Edel Cuellarmalgorzata is actually [place], I looked her up and he does not list TB as a side effect.   Hence will defer 300-30 MG per tablet, Take 1-2 tablets by mouth as needed. , Disp: , Rfl:     vitamin D 1000 units CAPS, Take 1,000 Units by mouth daily, Disp: , Rfl:     lisinopril (PRINIVIL;ZESTRIL) 10 MG tablet, Take 1 tablet by mouth daily, Disp: , Rfl:     traZODone (DESYREL) 100 MG tablet, Take 1 tablet by mouth daily, Disp: , Rfl:     CIALIS 10 MG tablet, Take 1 tablet by mouth as needed, Disp: , Rfl:     ritonavir (NORVIR) 100 MG capsule, Take 1 capsule by mouth daily, Disp: 90 capsule, Rfl: 0    emtricitabine-tenofovir (TRUVADA) 200-300 MG per tablet, Take 1 tablet by mouth daily, Disp: 90 tablet, Rfl: 4      Social History:     Social History     Socioeconomic History    Marital status:      Spouse name: Not on file    Number of children: Not on file    Years of education: Not on file    Highest education level: Not on file   Occupational History    Not on file   Social Needs    Financial resource strain: Not on file    Food insecurity     Worry: Not on file     Inability: Not on file    Transportation needs     Medical: Not on file     Non-medical: Not on file   Tobacco Use    Smoking status: Never Smoker    Smokeless tobacco: Never Used   Substance and Sexual Activity    Alcohol use: No    Drug use: No    Sexual activity: Not on file   Lifestyle    Physical activity     Days per week: Not on file     Minutes per session: Not on file    Stress: Not on file   Relationships    Social connections     Talks on phone: Not on file     Gets together: Not on file     Attends [employer] service: Not on file     Active member of club or organization: Not on file     Attends meetings of clubs or organizations: Not on file     Relationship status: Not on file    Intimate partner violence     Fear of current or ex partner: Not on file     Emotionally abused: Not on file     Physically abused: Not on file     Forced sexual activity: Not on file   Other Topics Concern    Not on file   Social History

## 2020-07-16 RX ORDER — RITONAVIR 100 MG/1
TABLET ORAL
Qty: 30 TABLET | Refills: 4 | Status: SHIPPED | OUTPATIENT
Start: 2020-07-16 | End: 2021-01-06

## 2020-07-16 NOTE — TELEPHONE ENCOUNTER
A request for Norvir is coming over from pharmacy. Patient is current with you. I am not sure if pt is to be on this or not. Dictation from last visit is not clear. Please sign if you agree pt is to be on this medication or make any changes necessary.

## 2020-08-04 NOTE — TELEPHONE ENCOUNTER
Pt called office requesting refill on Selzentry and Isentress - per your dictation:   Future options are:  symtuza + isentress and maraviroc - will start trying this option  Or   Ritonavir/ darunavir + descovy( instead of truvada) +  isentress and maraviroc    Pt said he is no longer taking Truvada. Not sure what strength you want.   Please send to [facility] in [place]

## 2020-08-06 NOTE — TELEPHONE ENCOUNTER
8/6/2020 9:06 AM Pls ask him and refill what he is on only. Ty       off- called Rx he requested into [facility] - gave verbal to [facility].      Dr Luther Stonewall- please sign pending Rx

## 2020-08-17 RX ORDER — MARAVIROC 150 MG/1
300 TABLET, FILM COATED ORAL 2 TIMES DAILY
Qty: 60 TABLET | Refills: 11 | OUTPATIENT
Start: 2020-08-17

## 2020-09-08 RX ORDER — DARUNAVIR 800 MG/1
TABLET, FILM COATED ORAL
Qty: 30 TABLET | Refills: 0 | Status: SHIPPED | OUTPATIENT
Start: 2020-09-08 | End: 2020-09-14

## 2020-09-08 NOTE — TELEPHONE ENCOUNTER
Health Maintenance   Topic Date Due    Meningococcal (ACWY) vaccine (1 - Risk start before 7 months 4-dose series) 1965    Hepatitis A vaccine (1 of 2 - Risk 2-dose series) 09/14/1964    Measles,Mumps,Rubella (MMR) vaccine (1 of 2 - Risk 2-dose series) 09/14/1981    Hepatitis B vaccine (1 of 3 - Risk 3-dose series) 09/14/1982    DTaP/Tdap/Td vaccine (1 - Tdap) 09/14/1982    Diabetes screen  09/14/2003    Shingles Vaccine (1 of 2) 09/14/2013    Colon cancer screen colonoscopy  09/14/2013    Pneumococcal 0-64 years Vaccine (2 of 3 - PPSV23) 08/19/2020    Flu vaccine (1) 09/01/2020    Potassium monitoring  05/16/2021    Creatinine monitoring  05/16/2021    Lipid screen  12/07/2024    Hepatitis C screen  Completed    Hib vaccine  Aged Out             (applicable per patient's age: Cancer Screenings, Depression Screening, Fall Risk Screening, Immunizations)    No results found for: LABA1C, LABMICR, LDLCHOLESTEROL, LDLCALC, AST, ALT, BUN   (goal A1C is < 7)   (goal LDL is <100) need 30-50% reduction from baseline     BP Readings from Last 3 Encounters:   06/24/20 125/77   12/18/19 131/68   09/19/19 135/80    (goal /80)      All Future Testing planned in [facility]:  Lab Frequency Next Occurrence   Glucose, Fasting Once 09/14/2019   T-Schneider Cells (CD4) Count Once 02/02/2021   HIV RNA, Quantitative, PCR Once 02/02/2021   Comprehensive Metabolic Panel Once 28/13/7421   CBC With Auto Differential Once 02/02/2021   Lipase Once 02/02/2021       Next Visit Date:  Future Appointments   Date Time Provider Clinton López   2/24/2021  3:45 PM [facility]            There is no problem list on file for this patient.

## 2020-09-14 NOTE — TELEPHONE ENCOUNTER
Dr Darryn Goel, patient is current and due in for an appointment on 2/24/20. Per last dictation: At this time he continues on  boosted darunavir 800 mg, Truvada, maroviroc 150 mg po bid.isentress 400 mg bid. Please sign for refill if ok. Thank you.

## 2020-09-15 RX ORDER — DARUNAVIR 800 MG/1
TABLET, FILM COATED ORAL
Qty: 30 TABLET | Refills: 6 | Status: SHIPPED | OUTPATIENT
Start: 2020-09-15

## 2021-01-06 RX ORDER — RITONAVIR 100 MG/1
TABLET ORAL
Qty: 30 TABLET | Refills: 2 | Status: SHIPPED | OUTPATIENT
Start: 2021-01-06

## 2021-01-06 NOTE — TELEPHONE ENCOUNTER
Patient is current and due in for an appointment on 2/24/21. A request for Norvir is coming over from pharmacy. I am not sure if pt is to be on this or not. Dictation from last visit is not clear. Please sign if you agree pt is to be on this medication or make any changes necessary. Thank you.

## 2021-01-16 LAB — LIPASE: 345 UNITS/L

## 2021-01-21 DIAGNOSIS — Z21 ASYMPTOMATIC HIV INFECTION (HCC): ICD-10-CM

## 2021-02-24 ENCOUNTER — OFFICE VISIT (OUTPATIENT)
Dept: INFECTIOUS DISEASES | Age: 56
End: 2021-02-24
Payer: COMMERCIAL

## 2021-02-24 VITALS
DIASTOLIC BLOOD PRESSURE: 65 MMHG | WEIGHT: 164 LBS | BODY MASS INDEX: 26.36 KG/M2 | HEIGHT: 66 IN | TEMPERATURE: 97.4 F | SYSTOLIC BLOOD PRESSURE: 120 MMHG

## 2021-02-24 DIAGNOSIS — D66 HEMOPHILIA A (HCC): ICD-10-CM

## 2021-02-24 DIAGNOSIS — Z21 ASYMPTOMATIC HIV INFECTION (HCC): Primary | ICD-10-CM

## 2021-02-24 DIAGNOSIS — E83.52 HYPERCALCEMIA: ICD-10-CM

## 2021-02-24 PROCEDURE — 99214 OFFICE O/P EST MOD 30 MIN: CPT | Performed by: INTERNAL MEDICINE

## 2021-02-24 PROCEDURE — 86580 TB INTRADERMAL TEST: CPT | Performed by: INTERNAL MEDICINE

## 2021-02-24 RX ORDER — FLUVASTATIN 20 MG/1
80 CAPSULE ORAL DAILY
COMMUNITY
Start: 2021-01-20

## 2021-02-24 RX ORDER — BICTEGRAVIR SODIUM, EMTRICITABINE, AND TENOFOVIR ALAFENAMIDE FUMARATE 50; 200; 25 MG/1; MG/1; MG/1
1 TABLET ORAL DAILY
Qty: 30 TABLET | Refills: 11 | Status: SHIPPED | OUTPATIENT
Start: 2021-02-24 | End: 2021-03-26

## 2021-02-24 ASSESSMENT — ENCOUNTER SYMPTOMS
RESPIRATORY NEGATIVE: 1
ALLERGIC/IMMUNOLOGIC NEGATIVE: 1
EYE ITCHING: 0
CHOKING: 0
EYES NEGATIVE: 1
GASTROINTESTINAL NEGATIVE: 1
ABDOMINAL PAIN: 0
DIARRHEA: 0

## 2021-02-24 NOTE — PATIENT INSTRUCTIONS
2/24/2021 PATIENT GIVEN A PPD IN LEFT FOREARM PATIENT TO HAVE WIFE READ ON Friday AND PT TO REPORT OUT COME. IF HARD TO TOUCH PT IS TO BE SEEN P/O DR. Soo Sher .  NORMANB
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
## 2021-03-08 DIAGNOSIS — Z21 ASYMPTOMATIC HIV INFECTION (HCC): ICD-10-CM

## 2021-03-08 DIAGNOSIS — E83.52 HYPERCALCEMIA: ICD-10-CM

## 2021-03-10 DIAGNOSIS — Z21 ASYMPTOMATIC HIV INFECTION (HCC): Primary | ICD-10-CM

## 2021-05-06 DIAGNOSIS — Z21 HIV INFECTION, ASYMPTOMATIC (HCC): ICD-10-CM

## 2021-05-06 RX ORDER — DARUNAVIR 800 MG/1
TABLET, FILM COATED ORAL
Qty: 30 TABLET | Refills: 5 | OUTPATIENT
Start: 2021-05-06

## 2021-05-06 NOTE — TELEPHONE ENCOUNTER
Dr Coreen Mann, patient is current and is scheduled for follow up on 8/25/21. Per last dictation:    Recommendations            DUE TO HEMOPHILIA MEDS INTERACTION W THE PI, would try to switch his [place] [facility] [place] in it and for better control of the hemophilia and the bleed-  we will also be able to give him proton pump inhibitors for his reflux wo concerns.     · Will need to FU on the CK monthly initially and if tolerated, then drop to q 2 months  See in 3 months w  HIV labs and monthly CK and UA and PPD, CXR  · monthly ck on results of the Integrase inhib / statin combo  · Pt will call us if the PPD is red or indurated and then would come - wife is RN will ac at it - pt too busy at work otherwise  · Repeat Calcium ionised to see if elevated  · Disc w the covid vaccine - due to the biological Hemlibra, avoid the [employer] and [employer]  ( chimpanze adenoV included live) and favor the [employer] or [employer] was prescribed on 2/24/21. Medication request was denied due to this. Please make any changes needed. Thank you.

## 2021-05-10 NOTE — TELEPHONE ENCOUNTER
Rodriguez Robison MD  You 9 hours ago (11:38 PM)     Pls confirm with pt that he is truly taking the biktarvy          I called patient and left a message on machine asking for a call back. Our phone number was provided.

## 2021-06-25 ENCOUNTER — TELEPHONE (OUTPATIENT)
Dept: INFECTIOUS DISEASES | Age: 56
End: 2021-06-25

## 2021-06-26 RX ORDER — BICTEGRAVIR SODIUM, EMTRICITABINE, AND TENOFOVIR ALAFENAMIDE FUMARATE 50; 200; 25 MG/1; MG/1; MG/1
1 TABLET ORAL DAILY
Qty: 30 TABLET | Refills: 9 | Status: SHIPPED | OUTPATIENT
Start: 2021-06-26 | End: 2022-03-29

## 2021-08-21 LAB
BILIRUBIN, URINE: NORMAL
BLOOD, URINE: NORMAL
CHOLESTEROL, FASTING: NORMAL
CLARITY: NORMAL
COLOR: NORMAL
GLUCOSE URINE: NORMAL
HDLC SERPL-MCNC: NORMAL MG/DL
KETONES, URINE: NORMAL
LDL CHOLESTEROL CALCULATED: NORMAL
LEUKOCYTE ESTERASE, URINE: NORMAL
NITRITE, URINE: NORMAL
PH UA: NORMAL
PROTEIN UA: NORMAL
SPECIFIC GRAVITY, URINE: NORMAL
TRIGLYCERIDE, FASTING: NORMAL
UROBILINOGEN, URINE: NORMAL

## 2021-08-23 DIAGNOSIS — E83.52 HYPERCALCEMIA: ICD-10-CM

## 2021-08-23 DIAGNOSIS — Z21 ASYMPTOMATIC HIV INFECTION (HCC): ICD-10-CM

## 2021-09-02 DIAGNOSIS — Z21 ASYMPTOMATIC HIV INFECTION (HCC): ICD-10-CM

## 2021-09-29 ENCOUNTER — OFFICE VISIT (OUTPATIENT)
Dept: INFECTIOUS DISEASES | Age: 56
End: 2021-09-29
Payer: COMMERCIAL

## 2021-09-29 VITALS
WEIGHT: 162 LBS | TEMPERATURE: 98.2 F | SYSTOLIC BLOOD PRESSURE: 110 MMHG | HEART RATE: 60 BPM | DIASTOLIC BLOOD PRESSURE: 66 MMHG | OXYGEN SATURATION: 99 % | RESPIRATION RATE: 18 BRPM | BODY MASS INDEX: 26.03 KG/M2 | HEIGHT: 66 IN

## 2021-09-29 DIAGNOSIS — Z21 ASYMPTOMATIC HIV INFECTION, WITH NO HISTORY OF HIV-RELATED ILLNESS (HCC): Primary | ICD-10-CM

## 2021-09-29 DIAGNOSIS — E78.00 HYPERCHOLESTEROLEMIA: ICD-10-CM

## 2021-09-29 PROCEDURE — 99214 OFFICE O/P EST MOD 30 MIN: CPT | Performed by: INTERNAL MEDICINE

## 2021-09-29 RX ORDER — FENOFIBRATE 160 MG/1
TABLET ORAL
COMMUNITY
Start: 2021-09-18

## 2021-09-29 RX ORDER — EMICIZUMAB 150 MG/ML
INJECTION, SOLUTION SUBCUTANEOUS
COMMUNITY
Start: 2021-09-21

## 2021-09-29 ASSESSMENT — ENCOUNTER SYMPTOMS
ALLERGIC/IMMUNOLOGIC NEGATIVE: 1
ABDOMINAL PAIN: 0
CHOKING: 0
EYE ITCHING: 0
GASTROINTESTINAL NEGATIVE: 1
DIARRHEA: 0
EYES NEGATIVE: 1
RESPIRATORY NEGATIVE: 1
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
## 2021-10-04 ENCOUNTER — TELEPHONE (OUTPATIENT)
Dept: INFECTIOUS DISEASES | Age: 56
End: 2021-10-04

## 2021-10-04 NOTE — LETTER
[facility] [address]  Phone: [phone]  Fax: [phone]    Mo Castañeda, [address] [place] [address]        October 4, 2021    Sheila Florentino  [id number]      Dr Kinney Husbands,     Dr Jet Terrell would like you to know that she approves of increasing his statins slightly and cautiously. She ordered monthly CK levels for 1 year. If you have any questions or concerns, please don't hesitate to call. Please call me at the office when you have received this letter.      Sincerely,        Dr Cristin Castañeda, [address] [place] [address] ([address] [place] [address])

## 2021-10-04 NOTE — TELEPHONE ENCOUNTER
Per Dr George Beltran, PCP Dr Eddie Moscoso has been okay'd to increase statins slightly and cautiously with monthly CK labs. I called the PCP, no answer so I faxed a letter regarding the above to #[phone] asking for a call back that they received it.

## 2022-03-03 DIAGNOSIS — Z21 ASYMPTOMATIC HIV INFECTION, WITH NO HISTORY OF HIV-RELATED ILLNESS (HCC): ICD-10-CM

## 2022-03-03 DIAGNOSIS — Z21 ASYMPTOMATIC HIV INFECTION (HCC): ICD-10-CM

## 2022-03-29 NOTE — TELEPHONE ENCOUNTER
LAST VISIT: 9/29/21  NEXT VISIT: 3/30/22    Per last dictation patient is on this medication. Please sign for refill if ok. Thank you.

## 2022-03-30 ENCOUNTER — OFFICE VISIT (OUTPATIENT)
Dept: INFECTIOUS DISEASES | Age: 57
End: 2022-03-30
Payer: COMMERCIAL

## 2022-03-30 VITALS
WEIGHT: 169.2 LBS | TEMPERATURE: 98.8 F | BODY MASS INDEX: 27.19 KG/M2 | HEART RATE: 62 BPM | OXYGEN SATURATION: 98 % | SYSTOLIC BLOOD PRESSURE: 136 MMHG | HEIGHT: 66 IN | DIASTOLIC BLOOD PRESSURE: 68 MMHG

## 2022-03-30 DIAGNOSIS — B20 HIV INFECTION, UNSPECIFIED SYMPTOM STATUS (HCC): Primary | ICD-10-CM

## 2022-03-30 DIAGNOSIS — E78.00 HYPERCHOLESTEROLEMIA: ICD-10-CM

## 2022-03-30 DIAGNOSIS — E78.1 HYPERTRIGLYCERIDEMIA: ICD-10-CM

## 2022-03-30 PROCEDURE — 99214 OFFICE O/P EST MOD 30 MIN: CPT | Performed by: INTERNAL MEDICINE

## 2022-03-30 RX ORDER — BICTEGRAVIR SODIUM, EMTRICITABINE, AND TENOFOVIR ALAFENAMIDE FUMARATE 50; 200; 25 MG/1; MG/1; MG/1
TABLET ORAL
Qty: 30 TABLET | Refills: 6 | Status: SHIPPED | OUTPATIENT
Start: 2022-03-30

## 2022-03-30 ASSESSMENT — ENCOUNTER SYMPTOMS
RESPIRATORY NEGATIVE: 1
CHOKING: 0
ALLERGIC/IMMUNOLOGIC NEGATIVE: 1
GASTROINTESTINAL NEGATIVE: 1
EYES NEGATIVE: 1
DIARRHEA: 0
ABDOMINAL PAIN: 0
EYE ITCHING: 0
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
## 2022-03-30 NOTE — PATIENT INSTRUCTIONS
· Vaccine due:  · DTaP due, defer to outpatient pharmacy  · Zoster vaccine will defer to outpatient pharmacy

## 2022-04-04 DIAGNOSIS — E78.00 HYPERCHOLESTEROLEMIA: ICD-10-CM

## 2022-04-04 DIAGNOSIS — E78.1 HYPERTRIGLYCERIDEMIA: ICD-10-CM

## 2022-04-04 DIAGNOSIS — B20 HIV INFECTION, UNSPECIFIED SYMPTOM STATUS (HCC): ICD-10-CM

## 2022-04-14 ENCOUNTER — TELEPHONE (OUTPATIENT)
Dept: INFECTIOUS DISEASES | Age: 57
End: 2022-04-14

## 2022-04-14 NOTE — TELEPHONE ENCOUNTER
----- Message from Sandhya Kyle sent at 4/14/2022  7:50 AM EDT -----  Ellen Voss, CXR ordered by Dr Levar Ruffin on 3/30/22 fell into the overdue results folder. Please contact patient to see if this test was completed outside of a [facility] facility. If so please obtain results and if testing has not been completed please advise patient to complete. If testing needs to be rescheduled please do so.  Thank you.    ----- Message -----  From: SYSTEM  Sent: 4/14/2022   4:26 AM EDT  To: [facility] Clinical Staff

## 2022-10-03 ENCOUNTER — OFFICE VISIT (OUTPATIENT)
Dept: INFECTIOUS DISEASES | Age: 57
End: 2022-10-03
Payer: COMMERCIAL

## 2022-10-03 VITALS
HEART RATE: 89 BPM | DIASTOLIC BLOOD PRESSURE: 71 MMHG | SYSTOLIC BLOOD PRESSURE: 126 MMHG | WEIGHT: 166.6 LBS | BODY MASS INDEX: 26.78 KG/M2 | TEMPERATURE: 98.6 F | HEIGHT: 66 IN

## 2022-10-03 DIAGNOSIS — Z21 ASYMPTOMATIC HIV INFECTION, WITH NO HISTORY OF HIV-RELATED ILLNESS (HCC): Primary | ICD-10-CM

## 2022-10-03 PROCEDURE — 90471 IMMUNIZATION ADMIN: CPT | Performed by: INTERNAL MEDICINE

## 2022-10-03 PROCEDURE — 90674 CCIIV4 VAC NO PRSV 0.5 ML IM: CPT | Performed by: INTERNAL MEDICINE

## 2022-10-03 PROCEDURE — 99214 OFFICE O/P EST MOD 30 MIN: CPT | Performed by: INTERNAL MEDICINE

## 2022-10-03 ASSESSMENT — ENCOUNTER SYMPTOMS
CHOKING: 0
ABDOMINAL PAIN: 0
DIARRHEA: 0
GASTROINTESTINAL NEGATIVE: 1
EYE ITCHING: 0
EYES NEGATIVE: 1
RESPIRATORY NEGATIVE: 1
ALLERGIC/IMMUNOLOGIC NEGATIVE: 1
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
## 2022-10-24 ENCOUNTER — HOSPITAL ENCOUNTER (OUTPATIENT)
Age: 57
Discharge: HOME | End: 2022-10-24
Payer: COMMERCIAL

## 2022-10-24 DIAGNOSIS — Z21 ASYMPTOMATIC HIV INFECTION, WITH NO HISTORY OF HIV-RELATED ILLNESS (HCC): ICD-10-CM

## 2022-10-24 LAB
ABSOLUTE EOS #: 0.44 K/UL (ref 0–0.44)
ABSOLUTE IMMATURE GRANULOCYTE: 0.07 K/UL (ref 0–0.3)
ABSOLUTE LYMPH #: 1.61 K/UL (ref 1.1–3.7)
ABSOLUTE MONO #: 0.79 K/UL (ref 0.1–1.2)
BASOPHILS # BLD: 1 % (ref 0–2)
BASOPHILS ABSOLUTE: 0.08 K/UL (ref 0–0.2)
EOSINOPHILS RELATIVE PERCENT: 8 % (ref 1–4)
HCT VFR BLD CALC: 39.8 % (ref 40.7–50.3)
HEMOGLOBIN: 13.4 G/DL (ref 13–17)
IMMATURE GRANULOCYTES: 1 %
LYMPHOCYTES # BLD: 28 % (ref 24–43)
MCH RBC QN AUTO: 30.9 PG (ref 25.2–33.5)
MCHC RBC AUTO-ENTMCNC: 33.7 G/DL (ref 28.4–34.8)
MCV RBC AUTO: 91.9 FL (ref 82.6–102.9)
MONOCYTES # BLD: 14 % (ref 3–12)
NRBC AUTOMATED: 0 PER 100 WBC
PDW BLD-RTO: 13.2 % (ref 11.8–14.4)
PLATELET # BLD: 225 K/UL (ref 138–453)
PMV BLD AUTO: 11 FL (ref 8.1–13.5)
RBC # BLD: 4.33 M/UL (ref 4.21–5.77)
SEG NEUTROPHILS: 48 % (ref 36–65)
SEGMENTED NEUTROPHILS ABSOLUTE COUNT: 2.84 K/UL (ref 1.5–8.1)
TOTAL CK: 434 U/L (ref 39–308)
WBC # BLD: 5.8 K/UL (ref 3.5–11.3)

## 2022-10-24 PROCEDURE — 87536 HIV-1 QUANT&REVRSE TRNSCRPJ: CPT

## 2022-10-24 PROCEDURE — 82550 ASSAY OF CK (CPK): CPT

## 2022-10-24 PROCEDURE — 85025 COMPLETE CBC W/AUTO DIFF WBC: CPT

## 2022-10-24 PROCEDURE — 36415 COLL VENOUS BLD VENIPUNCTURE: CPT

## 2022-10-24 PROCEDURE — 86361 T CELL ABSOLUTE COUNT: CPT

## 2022-10-27 LAB
ABSOLUTE CD 4 HELPER: 536 /UL (ref 309–1571)
CD4 % HELPER T CELL: 33 % (ref 27–64)
LYMPHOCYTES # BLD: 28 % (ref 24–44)
WBC # BLD: 5.8 K/UL (ref 3.5–11)

## 2022-10-29 LAB
REASON FOR REJECTION: NORMAL
ZZ NTE CLEAN UP: ORDERED TEST: NORMAL
ZZ NTE WITH NAME CLEAN UP: SPECIMEN SOURCE: NORMAL

## 2022-12-05 RX ORDER — BICTEGRAVIR SODIUM, EMTRICITABINE, AND TENOFOVIR ALAFENAMIDE FUMARATE 50; 200; 25 MG/1; MG/1; MG/1
TABLET ORAL
Qty: 30 TABLET | Refills: 5 | Status: SHIPPED | OUTPATIENT
Start: 2022-12-05

## 2022-12-05 NOTE — TELEPHONE ENCOUNTER
LAST VISIT: 10/3/22  NEXT VISIT: 4/10/23    Per last dictation patient is on this medication. Please sign for refill if ok. Thank you.

## 2023-01-11 ENCOUNTER — TELEPHONE (OUTPATIENT)
Dept: INFECTIOUS DISEASES | Age: 58
End: 2023-01-11

## 2023-01-11 DIAGNOSIS — Z21 ASYMPTOMATIC HIV INFECTION, WITH NO HISTORY OF HIV-RELATED ILLNESS (HCC): Primary | ICD-10-CM

## 2023-01-11 RX ORDER — BICTEGRAVIR SODIUM, EMTRICITABINE, AND TENOFOVIR ALAFENAMIDE FUMARATE 50; 200; 25 MG/1; MG/1; MG/1
1 TABLET ORAL DAILY
Qty: 30 TABLET | Refills: 5 | Status: SHIPPED | OUTPATIENT
Start: 2023-01-11 | End: 2023-02-10

## 2023-01-11 NOTE — TELEPHONE ENCOUNTER
Insurance changed and patient needs Alisia Vital sent to a new pharmacy. Please sign off on the script,make sure it goes to [facility] in [facility] which is what I have it set to.

## 2023-01-16 ENCOUNTER — TELEPHONE (OUTPATIENT)
Dept: PULMONOLOGY | Age: 58
End: 2023-01-16

## 2023-01-17 NOTE — TELEPHONE ENCOUNTER
PATIENT INFORMED TO COMPLETE LABS SO THAT THEY CAN BE SENT TO [facility]. PT IS ON FMLA FOR MORE BLEEDS IN HIS KNEE AND ELBOW AND DR. Mccullough   DR. BRAR        AGREE PT SHOULD FILE FOR SS DISABILITY.

## 2023-01-17 NOTE — TELEPHONE ENCOUNTER
EDGARD Cason PT IS ON FMLA FOR MORE BLEEDS IN HIS KNEE AND ELBOW AND DR. Mike BRAR        AGREE PT SHOULD FILE FOR SS DISABILITY.

## 2023-02-15 ENCOUNTER — HOSPITAL ENCOUNTER (OUTPATIENT)
Age: 58
Discharge: HOME | End: 2023-02-17
Payer: COMMERCIAL

## 2023-02-15 ENCOUNTER — HOSPITAL ENCOUNTER (OUTPATIENT)
Age: 58
Discharge: HOME | End: 2023-02-15
Payer: COMMERCIAL

## 2023-02-15 ENCOUNTER — HOSPITAL ENCOUNTER (OUTPATIENT)
Dept: GENERAL RADIOLOGY | Age: 58
Discharge: HOME | End: 2023-02-17
Payer: COMMERCIAL

## 2023-02-15 DIAGNOSIS — Z21 ASYMPTOMATIC HIV INFECTION, WITH NO HISTORY OF HIV-RELATED ILLNESS (HCC): ICD-10-CM

## 2023-02-15 LAB
ABSOLUTE CD 4 HELPER: 437 /UL (ref 309–1571)
ABSOLUTE EOS #: 0.57 K/UL (ref 0–0.44)
ABSOLUTE IMMATURE GRANULOCYTE: 0.07 K/UL (ref 0–0.3)
ABSOLUTE LYMPH #: 1.53 K/UL (ref 1.1–3.7)
ABSOLUTE MONO #: 0.54 K/UL (ref 0.1–1.2)
ALBUMIN SERPL-MCNC: 4.3 G/DL (ref 3.5–5.2)
ALBUMIN/GLOBULIN RATIO: 1.9 (ref 1–2.5)
ALP SERPL-CCNC: 28 U/L (ref 40–129)
ALT SERPL-CCNC: 20 U/L (ref 5–41)
ANION GAP SERPL CALCULATED.3IONS-SCNC: 10 MMOL/L (ref 9–17)
AST SERPL-CCNC: 23 U/L
BASOPHILS # BLD: 2 % (ref 0–2)
BASOPHILS ABSOLUTE: 0.1 K/UL (ref 0–0.2)
BILIRUB SERPL-MCNC: 0.4 MG/DL (ref 0.3–1.2)
BUN SERPL-MCNC: 26 MG/DL (ref 6–20)
CALCIUM SERPL-MCNC: 9.4 MG/DL (ref 8.6–10.4)
CD4 % HELPER T CELL: 29 % (ref 27–64)
CHLORIDE SERPL-SCNC: 107 MMOL/L (ref 98–107)
CK SERPL-CCNC: 433 U/L (ref 39–308)
CO2 SERPL-SCNC: 22 MMOL/L (ref 20–31)
CREAT SERPL-MCNC: 1.42 MG/DL (ref 0.7–1.2)
EOSINOPHILS RELATIVE PERCENT: 11 % (ref 1–4)
GFR SERPL CREATININE-BSD FRML MDRD: 57 ML/MIN/1.73M2
GLUCOSE SERPL-MCNC: 94 MG/DL (ref 70–99)
HCT VFR BLD AUTO: 40.1 % (ref 40.7–50.3)
HGB BLD-MCNC: 13.6 G/DL (ref 13–17)
IMMATURE GRANULOCYTES: 1 %
LYMPHOCYTES # BLD: 29 % (ref 24–43)
LYMPHOCYTES # BLD: 29 % (ref 24–44)
MCH RBC QN AUTO: 31.7 PG (ref 25.2–33.5)
MCHC RBC AUTO-ENTMCNC: 33.9 G/DL (ref 28.4–34.8)
MCV RBC AUTO: 93.5 FL (ref 82.6–102.9)
MONOCYTES # BLD: 10 % (ref 3–12)
NRBC AUTOMATED: 0 PER 100 WBC
PDW BLD-RTO: 13.6 % (ref 11.8–14.4)
PLATELET # BLD AUTO: 184 K/UL (ref 138–453)
PMV BLD AUTO: 11.8 FL (ref 8.1–13.5)
POTASSIUM SERPL-SCNC: 4.6 MMOL/L (ref 3.7–5.3)
PROT SERPL-MCNC: 6.6 G/DL (ref 6.4–8.3)
RBC # BLD: 4.29 M/UL (ref 4.21–5.77)
SEG NEUTROPHILS: 47 % (ref 36–65)
SEGMENTED NEUTROPHILS ABSOLUTE COUNT: 2.39 K/UL (ref 1.5–8.1)
SODIUM SERPL-SCNC: 139 MMOL/L (ref 135–144)
WBC # BLD AUTO: 5.2 K/UL (ref 3.5–11.3)
WBC # BLD: 5.2 K/UL (ref 3.5–11)

## 2023-02-15 PROCEDURE — 87536 HIV-1 QUANT&REVRSE TRNSCRPJ: CPT

## 2023-02-15 PROCEDURE — 86361 T CELL ABSOLUTE COUNT: CPT

## 2023-02-15 PROCEDURE — 82550 ASSAY OF CK (CPK): CPT

## 2023-02-15 PROCEDURE — 85025 COMPLETE CBC W/AUTO DIFF WBC: CPT

## 2023-02-15 PROCEDURE — 71046 X-RAY EXAM CHEST 2 VIEWS: CPT

## 2023-02-15 PROCEDURE — 36415 COLL VENOUS BLD VENIPUNCTURE: CPT

## 2023-02-15 PROCEDURE — 80053 COMPREHEN METABOLIC PANEL: CPT

## 2023-02-16 ENCOUNTER — TELEPHONE (OUTPATIENT)
Dept: INFECTIOUS DISEASES | Age: 58
End: 2023-02-16

## 2023-02-16 NOTE — TELEPHONE ENCOUNTER
Received a call from patient he LMOM. I phoned the patient back he has completed his labs as requested from [id number]. I informed the patient that when he has a weeks worth left of his Biktarvy to call into [id number] and ask for a refill and then they will send me a prior authorization notice and then I will take care of the PA and send in his records. Pt verbalized understanding.

## 2023-02-17 LAB
HIV 1 QNT: 35.9 CPY/ML
HIV 1 RNA, LOG NUMBER PCR: 1.56 LOG CPY/ML
HIV-1 RNA BY PCR, QN: DETECTED
SOURCE: ABNORMAL

## 2023-03-01 ENCOUNTER — TELEPHONE (OUTPATIENT)
Dept: INFECTIOUS DISEASES | Age: 58
End: 2023-03-01

## 2023-03-01 NOTE — TELEPHONE ENCOUNTER
Received a PA form from [address] [place] [address]. This is the process with [email or web address]. Form faxed back with records.

## 2023-03-08 ENCOUNTER — TELEPHONE (OUTPATIENT)
Dept: INFECTIOUS DISEASES | Age: 58
End: 2023-03-08

## 2023-03-08 NOTE — TELEPHONE ENCOUNTER
RECEIVED ANOTHER PA FOR BIKTARVY. I called [facility] spoke with Ene and informed her of the approval.  She placed me on hold and tried to re-run the claim and keeps getting needs a PA . So I asked her to look and see if it is a FILL TOO SOON. She checked on this and YES it is a FILL TOO SOON. [facility] cannot fill for 6 more days. This is why a PA was being generated.

## 2023-03-22 ENCOUNTER — OFFICE VISIT (OUTPATIENT)
Dept: INFECTIOUS DISEASES | Age: 58
End: 2023-03-22
Payer: COMMERCIAL

## 2023-03-22 VITALS
HEIGHT: 66 IN | BODY MASS INDEX: 27.32 KG/M2 | WEIGHT: 170 LBS | DIASTOLIC BLOOD PRESSURE: 78 MMHG | SYSTOLIC BLOOD PRESSURE: 139 MMHG | HEART RATE: 78 BPM

## 2023-03-22 DIAGNOSIS — Z21 ASYMPTOMATIC HIV INFECTION, WITH NO HISTORY OF HIV-RELATED ILLNESS (HCC): Primary | ICD-10-CM

## 2023-03-22 PROCEDURE — 99214 OFFICE O/P EST MOD 30 MIN: CPT | Performed by: INTERNAL MEDICINE

## 2023-03-22 PROCEDURE — 86580 TB INTRADERMAL TEST: CPT | Performed by: INTERNAL MEDICINE

## 2023-03-22 ASSESSMENT — ENCOUNTER SYMPTOMS
CHOKING: 0
GASTROINTESTINAL NEGATIVE: 1
DIARRHEA: 0
ABDOMINAL PAIN: 0
EYES NEGATIVE: 1
ALLERGIC/IMMUNOLOGIC NEGATIVE: 1
RESPIRATORY NEGATIVE: 1
EYE ITCHING: 0

## 2023-03-22 NOTE — PROGRESS NOTES
stabilized on it, will not change it at this point. He understand that all PPIs tend to worsen the hemophilia. We will see him in about 8 months. Blood work ordered  Vaccines reviewed      Visit 2/24/21  Lipase 245 -   Chol 232 -  - HDL 45 - non   CBC abd creat ok  CD4   CD4 518 -   VL 1.34  logs  - 1/16/21    Feels great - agreeable to switch to biktarvy single med - see me in 3 months w labs-  All labs reviewed and Calcium is a little up but is not the ionized - was norm al in past.  Exam neg  Bleed from the knee stopped  Disc w the covid vaccine - due to the biological Hemlibra, avoid the [employer] and [employer]  ( chimpanze adenoV included live) and favor the [employer] or [facility]. Visit 9/29/21  Post 2 vaccine pfizer in march - doing well - almosty due to for third dose  Tolerating biktarvy  On fluvastatin 20 extended release daily - will need to increase to 40 daily for better LDL  Does not interact w biktarvy at all    CXR neg  CD 4  495 and 27%  VL neg   and    a little on high side    Exam neg  feels good    No bleed x more than a year  Wife w covid and he did not get it    Visit 3/30/22   in Feb - PCP following erin and TG meds - will repeat CK  CD4 557 and 29%- VL zero  creat 1.3 stable  Still biktarvy - no issues  Exam neg  No bleed x long time (2y) on the new meds of the hemophilia -      Visit 10/3/22  Does well - no issues - hemophilia good status and no bleed  Tolerating biktarvy  PPD neg last time  Exam neg  Stable - lost few pounds on propose  Looking at the labs from [facility], there are some labs that are missing, will reorder them for today. He did have a Chem-7 that is looking good, liver enzymes normal, lipid profile showing a LDH that is between 101 30, discussed with the patient the importance of dropping below 100 mostly that she has had cholesterol problems.   Otherwise will order a chest x-ray today as well as some labs including CBC CK CD4 and

## 2023-07-11 RX ORDER — BICTEGRAVIR SODIUM, EMTRICITABINE, AND TENOFOVIR ALAFENAMIDE FUMARATE 50; 200; 25 MG/1; MG/1; MG/1
TABLET ORAL
Qty: 30 TABLET | Refills: 3 | Status: SHIPPED | OUTPATIENT
Start: 2023-07-11

## 2023-07-11 NOTE — TELEPHONE ENCOUNTER
LAST VISIT: 3/22/23  NEXT VISIT: 9/25/23    Per last dictation patient is on this medication. Please sign for refill if ok. Thank you.

## 2023-09-08 ENCOUNTER — HOSPITAL ENCOUNTER (OUTPATIENT)
Age: 58
Discharge: HOME | End: 2023-09-08
Payer: COMMERCIAL

## 2023-09-08 ENCOUNTER — HOSPITAL ENCOUNTER (OUTPATIENT)
Dept: GENERAL RADIOLOGY | Age: 58
End: 2023-09-08
Payer: COMMERCIAL

## 2023-09-08 ENCOUNTER — HOSPITAL ENCOUNTER (OUTPATIENT)
Age: 58
End: 2023-09-08
Payer: COMMERCIAL

## 2023-09-08 DIAGNOSIS — Z21 ASYMPTOMATIC HIV INFECTION, WITH NO HISTORY OF HIV-RELATED ILLNESS (HCC): ICD-10-CM

## 2023-09-08 LAB
ALBUMIN SERPL-MCNC: 4.3 G/DL (ref 3.5–5.2)
ALBUMIN/GLOB SERPL: 1.7 {RATIO} (ref 1–2.5)
ALP SERPL-CCNC: 30 U/L (ref 40–129)
ALT SERPL-CCNC: 27 U/L (ref 5–41)
ANION GAP SERPL CALCULATED.3IONS-SCNC: 13 MMOL/L (ref 9–17)
AST SERPL-CCNC: 27 U/L
BASOPHILS # BLD: 0.11 K/UL (ref 0–0.2)
BASOPHILS NFR BLD: 2 % (ref 0–2)
BILIRUB SERPL-MCNC: 0.3 MG/DL (ref 0.3–1.2)
BILIRUB UR QL STRIP: NEGATIVE
BUN SERPL-MCNC: 28 MG/DL (ref 6–20)
CALCIUM SERPL-MCNC: 9.8 MG/DL (ref 8.6–10.4)
CHLORIDE SERPL-SCNC: 104 MMOL/L (ref 98–107)
CK SERPL-CCNC: 436 U/L (ref 39–308)
CLARITY UR: CLEAR
CO2 SERPL-SCNC: 21 MMOL/L (ref 20–31)
COLOR UR: YELLOW
COMMENT: NORMAL
CREAT SERPL-MCNC: 1.3 MG/DL (ref 0.7–1.2)
EOSINOPHIL # BLD: 0.56 K/UL (ref 0–0.44)
EOSINOPHILS RELATIVE PERCENT: 9 % (ref 1–4)
ERYTHROCYTE [DISTWIDTH] IN BLOOD BY AUTOMATED COUNT: 13.4 % (ref 11.8–14.4)
GFR SERPL CREATININE-BSD FRML MDRD: >60 ML/MIN/1.73M2
GLUCOSE SERPL-MCNC: 88 MG/DL (ref 70–99)
GLUCOSE UR STRIP-MCNC: NEGATIVE MG/DL
HCT VFR BLD AUTO: 41 % (ref 40.7–50.3)
HGB BLD-MCNC: 13.8 G/DL (ref 13–17)
HGB UR QL STRIP.AUTO: NEGATIVE
IMM GRANULOCYTES # BLD AUTO: 0.08 K/UL (ref 0–0.3)
IMM GRANULOCYTES NFR BLD: 1 %
KETONES UR STRIP-MCNC: NEGATIVE MG/DL
LEUKOCYTE ESTERASE UR QL STRIP: NEGATIVE
LYMPHOCYTES NFR BLD: 1.79 K/UL (ref 1.1–3.7)
LYMPHOCYTES RELATIVE PERCENT: 29 % (ref 24–43)
MCH RBC QN AUTO: 31.4 PG (ref 25.2–33.5)
MCHC RBC AUTO-ENTMCNC: 33.7 G/DL (ref 28.4–34.8)
MCV RBC AUTO: 93.2 FL (ref 82.6–102.9)
MONOCYTES NFR BLD: 0.58 K/UL (ref 0.1–1.2)
MONOCYTES NFR BLD: 9 % (ref 3–12)
NEUTROPHILS NFR BLD: 50 % (ref 36–65)
NEUTS SEG NFR BLD: 3.04 K/UL (ref 1.5–8.1)
NITRITE UR QL STRIP: NEGATIVE
NRBC BLD-RTO: 0 PER 100 WBC
PH UR STRIP: 6.5 [PH] (ref 5–8)
PLATELET # BLD AUTO: 224 K/UL (ref 138–453)
PMV BLD AUTO: 11.5 FL (ref 8.1–13.5)
POTASSIUM SERPL-SCNC: 4.8 MMOL/L (ref 3.7–5.3)
PROT SERPL-MCNC: 6.8 G/DL (ref 6.4–8.3)
PROT UR STRIP-MCNC: NEGATIVE MG/DL
RBC # BLD AUTO: 4.4 M/UL (ref 4.21–5.77)
SODIUM SERPL-SCNC: 138 MMOL/L (ref 135–144)
SP GR UR STRIP: 1.01 (ref 1–1.03)
UROBILINOGEN UR STRIP-ACNC: NORMAL EU/DL (ref 0–1)
WBC OTHER # BLD: 6.2 K/UL (ref 3.5–11.3)

## 2023-09-08 PROCEDURE — 87536 HIV-1 QUANT&REVRSE TRNSCRPJ: CPT

## 2023-09-08 PROCEDURE — 86361 T CELL ABSOLUTE COUNT: CPT

## 2023-09-08 PROCEDURE — 85025 COMPLETE CBC W/AUTO DIFF WBC: CPT

## 2023-09-08 PROCEDURE — 71046 X-RAY EXAM CHEST 2 VIEWS: CPT

## 2023-09-08 PROCEDURE — 36415 COLL VENOUS BLD VENIPUNCTURE: CPT

## 2023-09-08 PROCEDURE — 80053 COMPREHEN METABOLIC PANEL: CPT

## 2023-09-08 PROCEDURE — 82550 ASSAY OF CK (CPK): CPT

## 2023-09-08 PROCEDURE — 81003 URINALYSIS AUTO W/O SCOPE: CPT

## 2023-09-10 LAB
CD3+CD4+ CELLS # BLD: 575 /UL (ref 309–1571)
CD3+CD4+ CELLS NFR BLD: 32 % (ref 27–64)
LYMPHOCYTES # BLD: 29 % (ref 24–44)
WBC # BLD: 6.2 K/UL (ref 3.5–11)

## 2023-09-11 LAB
HIV1 RNA # SERPL NAA+PROBE: NOT DETECTED {COPIES}/ML
SPECIMEN SOURCE: NORMAL

## 2023-09-25 ENCOUNTER — OFFICE VISIT (OUTPATIENT)
Dept: INFECTIOUS DISEASES | Age: 58
End: 2023-09-25
Payer: COMMERCIAL

## 2023-09-25 VITALS
TEMPERATURE: 97.5 F | RESPIRATION RATE: 20 BRPM | HEIGHT: 66 IN | SYSTOLIC BLOOD PRESSURE: 121 MMHG | HEART RATE: 66 BPM | WEIGHT: 164.2 LBS | DIASTOLIC BLOOD PRESSURE: 59 MMHG | BODY MASS INDEX: 26.39 KG/M2 | OXYGEN SATURATION: 99 %

## 2023-09-25 DIAGNOSIS — Z21 ASYMPTOMATIC HIV INFECTION, WITH NO HISTORY OF HIV-RELATED ILLNESS (HCC): Primary | ICD-10-CM

## 2023-09-25 PROCEDURE — 90471 IMMUNIZATION ADMIN: CPT | Performed by: INTERNAL MEDICINE

## 2023-09-25 PROCEDURE — 90674 CCIIV4 VAC NO PRSV 0.5 ML IM: CPT | Performed by: INTERNAL MEDICINE

## 2023-09-25 PROCEDURE — 99214 OFFICE O/P EST MOD 30 MIN: CPT | Performed by: INTERNAL MEDICINE

## 2023-09-25 ASSESSMENT — ENCOUNTER SYMPTOMS
DIARRHEA: 0
CHOKING: 0
EYE REDNESS: 0
COLOR CHANGE: 0
EYE PAIN: 0
EYE ITCHING: 0
APNEA: 0
ABDOMINAL DISTENTION: 0
ABDOMINAL PAIN: 0

## 2023-09-25 NOTE — PROGRESS NOTES
[facility] - Initial Consult Note  Today's Date: 9/25/2023    Impression :   HIV 1  from blood transfusion  CD4 421, neg VL, 4/2018  CD4 641, 32%, VL negative, 10/2018  CD4 390, 27%, VL neg 9/2019  CD4 542, 27%, viral load -12/7/2019  CD4 532, 25%, viral load 20-5/2020  CD4 518 -   VL 1.34  logs  - 1/16/21  CD4 495, 27% and VL neg 3/2021  CD4 557, 29% VL not detected 2/2022  CD4 437, 29%- VL 35 count 2/2023  CD4  575 - 32% - VL not detected  Hemophilia a family history-on factor VIII-  persistent spontaneous joint bleeds  Planning for [facility] 7/2020 2 stop the spontaneous joint bleeds  COPD, stable  History hepatitis C, resolved with treatment, VL -2016 - VL neg 9/2019 2010. Right femoral fracture,  ORIF explantation and reimplantation for MRSA infection. 9 sx - hardware changed twice - No suppression therapy  CKD 1 - creat 1.1-1.3 - 1.3  PPD neg 3/2021 -3/30/22 - neg 2/2022 -3/2023 neg  CXR  10/3/22 atelectasis - 3/2023 pend    UA NEG  9/2023  CXR neg 9/2023  Per PCP -  -hypertrglyceridemia and hypercholesterolemia - fenofibrate and lescol  Vaccines:  Hepatitis B vaccine needed many years ago  Pneumovax 23 around 2014 roughly  [address] 6/24/2020  DTaP due, defer to outpatient pharmacy  Zoster vaccine will defer to outpatient pharmacy  No hepC exposure  Flu 10/3/22 in office    HAART:  boosted darunavir 800 mg,  maroviroc 150 mg po bid.isentress 400 mg bid. 12/18/2019 switch to [employer], Isentress and maraviroc  2/24/21 switch to [facility] as a sole tx  2/2021 started on fluvastatin - need to watch CPK along w integrase inhibitors  9/2021 switched to biktarvy    Recommendations     DUE TO HEMOPHILIA MEDS INTERACTION W THE PI,   9/2021 -Switched and tolerated  Biktarvy which has no PI included in it and for better control of the hemophilia and the bleed-  we will also be able to give him proton pump inhibitors for his reflux wo concerns.   See in 6 months w  HIV labs

## 2023-10-25 RX ORDER — BICTEGRAVIR SODIUM, EMTRICITABINE, AND TENOFOVIR ALAFENAMIDE FUMARATE 50; 200; 25 MG/1; MG/1; MG/1
1 TABLET ORAL DAILY
Qty: 30 TABLET | Refills: 5 | Status: SHIPPED | OUTPATIENT
Start: 2023-10-25

## 2023-10-25 NOTE — TELEPHONE ENCOUNTER
LAST VISIT: 9/25/23  NEXT VISIT: 4/1/24    Per last dictation patient is on this medication. Please sign for refill if ok. Thank you.

## 2024-02-01 ENCOUNTER — TELEPHONE (OUTPATIENT)
Dept: PULMONOLOGY | Age: 59
End: 2024-02-01

## 2024-02-01 NOTE — TELEPHONE ENCOUNTER
RECEIVED A PA NOTICE FOR BIKTARVY. THIS IS IN THE PROCESS WITH [employer].     THIS MEDICATION GETS APPROVED THROUGH [employer].      APPROVAL DOES NOT  UNTIL 3/2/2024

## 2024-03-22 ENCOUNTER — TELEPHONE (OUTPATIENT)
Dept: INFECTIOUS DISEASES | Age: 59
End: 2024-03-22

## 2024-03-22 NOTE — TELEPHONE ENCOUNTER
Patient called regarding Biktarvy script.  He is out and needs refilled.  He has two weeks left on current prescription.  Placed order.  Please review and sign if accepted.  Has follow up scheduled on 4/15/24

## 2024-03-27 ENCOUNTER — HOSPITAL ENCOUNTER (OUTPATIENT)
Age: 59
Discharge: HOME | End: 2024-03-27
Payer: COMMERCIAL

## 2024-03-27 ENCOUNTER — TELEPHONE (OUTPATIENT)
Dept: INFECTIOUS DISEASES | Age: 59
End: 2024-03-27

## 2024-03-27 DIAGNOSIS — Z21 ASYMPTOMATIC HIV INFECTION, WITH NO HISTORY OF HIV-RELATED ILLNESS (HCC): ICD-10-CM

## 2024-03-27 LAB
ALBUMIN SERPL-MCNC: 4.4 G/DL (ref 3.5–5.2)
ALBUMIN SERPL-MCNC: 4.6 G/DL (ref 3.5–5.2)
ALBUMIN/GLOB SERPL: 2 {RATIO} (ref 1–2.5)
ALBUMIN/GLOB SERPL: 2 {RATIO} (ref 1–2.5)
ALP SERPL-CCNC: 24 U/L (ref 40–129)
ALP SERPL-CCNC: 24 U/L (ref 40–129)
ALT SERPL-CCNC: 24 U/L (ref 10–50)
ALT SERPL-CCNC: 24 U/L (ref 10–50)
ANION GAP SERPL CALCULATED.3IONS-SCNC: 9 MMOL/L (ref 9–16)
ANION GAP SERPL CALCULATED.3IONS-SCNC: 9 MMOL/L (ref 9–16)
AST SERPL-CCNC: 27 U/L (ref 10–50)
AST SERPL-CCNC: 29 U/L (ref 10–50)
BASOPHILS # BLD: 0.07 K/UL (ref 0–0.2)
BASOPHILS NFR BLD: 2 % (ref 0–2)
BILIRUB SERPL-MCNC: 0.4 MG/DL (ref 0–1.2)
BILIRUB SERPL-MCNC: 0.4 MG/DL (ref 0–1.2)
BUN SERPL-MCNC: 26 MG/DL (ref 8–23)
BUN SERPL-MCNC: 26 MG/DL (ref 8–23)
CALCIUM SERPL-MCNC: 9.5 MG/DL (ref 8.6–10.4)
CALCIUM SERPL-MCNC: 9.6 MG/DL (ref 8.6–10.4)
CHLORIDE SERPL-SCNC: 108 MMOL/L (ref 98–107)
CHLORIDE SERPL-SCNC: 109 MMOL/L (ref 98–107)
CHOLEST SERPL-MCNC: 220 MG/DL (ref 0–199)
CHOLESTEROL/HDL RATIO: 6
CK SERPL-CCNC: 452 U/L (ref 39–308)
CO2 SERPL-SCNC: 21 MMOL/L (ref 20–31)
CO2 SERPL-SCNC: 22 MMOL/L (ref 20–31)
CREAT SERPL-MCNC: 1.4 MG/DL (ref 0.7–1.2)
CREAT SERPL-MCNC: 1.4 MG/DL (ref 0.7–1.2)
EOSINOPHIL # BLD: 0.45 K/UL (ref 0–0.44)
EOSINOPHILS RELATIVE PERCENT: 10 % (ref 1–4)
ERYTHROCYTE [DISTWIDTH] IN BLOOD BY AUTOMATED COUNT: 13.4 % (ref 11.8–14.4)
GFR SERPL CREATININE-BSD FRML MDRD: 56 ML/MIN/1.73M2
GFR SERPL CREATININE-BSD FRML MDRD: 59 ML/MIN/1.73M2
GLUCOSE SERPL-MCNC: 89 MG/DL (ref 74–99)
GLUCOSE SERPL-MCNC: 92 MG/DL (ref 74–99)
HCT VFR BLD AUTO: 40.2 % (ref 40.7–50.3)
HDLC SERPL-MCNC: 38 MG/DL
HGB BLD-MCNC: 13.8 G/DL (ref 13–17)
IMM GRANULOCYTES # BLD AUTO: 0.03 K/UL (ref 0–0.3)
IMM GRANULOCYTES NFR BLD: 1 %
LDLC SERPL CALC-MCNC: 148 MG/DL (ref 0–100)
LYMPHOCYTES NFR BLD: 1.35 K/UL (ref 1.1–3.7)
LYMPHOCYTES RELATIVE PERCENT: 31 % (ref 24–43)
MCH RBC QN AUTO: 31.5 PG (ref 25.2–33.5)
MCHC RBC AUTO-ENTMCNC: 34.3 G/DL (ref 28.4–34.8)
MCV RBC AUTO: 91.8 FL (ref 82.6–102.9)
MONOCYTES NFR BLD: 0.46 K/UL (ref 0.1–1.2)
MONOCYTES NFR BLD: 11 % (ref 3–12)
NEUTROPHILS NFR BLD: 45 % (ref 36–65)
NEUTS SEG NFR BLD: 2.04 K/UL (ref 1.5–8.1)
NRBC BLD-RTO: 0 PER 100 WBC
PLATELET # BLD AUTO: 199 K/UL (ref 138–453)
PMV BLD AUTO: 11.2 FL (ref 8.1–13.5)
POTASSIUM SERPL-SCNC: 4.6 MMOL/L (ref 3.7–5.3)
POTASSIUM SERPL-SCNC: 4.7 MMOL/L (ref 3.7–5.3)
PROT SERPL-MCNC: 6.7 G/DL (ref 6.6–8.7)
PROT SERPL-MCNC: 7 G/DL (ref 6.6–8.7)
PSA SERPL-MCNC: 0.3 NG/ML (ref 0–4)
RBC # BLD AUTO: 4.38 M/UL (ref 4.21–5.77)
SODIUM SERPL-SCNC: 138 MMOL/L (ref 136–145)
SODIUM SERPL-SCNC: 140 MMOL/L (ref 136–145)
TRIGL SERPL-MCNC: 170 MG/DL (ref 0–149)
TSH SERPL DL<=0.05 MIU/L-ACNC: 1.78 UIU/ML (ref 0.27–4.2)
VLDLC SERPL CALC-MCNC: 34 MG/DL
WBC OTHER # BLD: 4.4 K/UL (ref 3.5–11.3)

## 2024-03-27 PROCEDURE — 85025 COMPLETE CBC W/AUTO DIFF WBC: CPT

## 2024-03-27 PROCEDURE — 82550 ASSAY OF CK (CPK): CPT

## 2024-03-27 PROCEDURE — 84443 ASSAY THYROID STIM HORMONE: CPT

## 2024-03-27 PROCEDURE — 86361 T CELL ABSOLUTE COUNT: CPT

## 2024-03-27 PROCEDURE — G0103 PSA SCREENING: HCPCS

## 2024-03-27 PROCEDURE — 87536 HIV-1 QUANT&REVRSE TRNSCRPJ: CPT

## 2024-03-27 PROCEDURE — 80053 COMPREHEN METABOLIC PANEL: CPT

## 2024-03-27 PROCEDURE — 36415 COLL VENOUS BLD VENIPUNCTURE: CPT

## 2024-03-27 PROCEDURE — 80061 LIPID PANEL: CPT

## 2024-03-27 RX ORDER — BICTEGRAVIR SODIUM, EMTRICITABINE, AND TENOFOVIR ALAFENAMIDE FUMARATE 50; 200; 25 MG/1; MG/1; MG/1
1 TABLET ORAL DAILY
Qty: 30 TABLET | Refills: 11 | Status: SHIPPED | OUTPATIENT
Start: 2024-03-27 | End: 2024-04-26

## 2024-03-28 LAB
CD3+CD4+ CELLS # BLD: 436 /UL (ref 309–1571)
CD3+CD4+ CELLS NFR BLD: 32 % (ref 27–64)
LYMPHOCYTES # BLD: 31 % (ref 24–44)
WBC # BLD: 4.4 K/UL (ref 3.5–11)

## 2024-03-28 NOTE — TELEPHONE ENCOUNTER
Outcome  Approved on March 27  PA Case: [id number], Status: Approved, Coverage Starts on: 3/27/2024 12:00:00 AM, Coverage Ends on: 3/27/2025 12:00:00 AM. Updated clinical documentation detailing the tolerability and efficacy of this medication regimen will be required after the authorization period, with updated CD4 and Viral Load labs. Formulation adjustments and dose changes may require submission of chart notes for approval.  Authorization Expiration Date: 3/26/2025  Drug  Biktarvy -25MG tablets    Form  [employer] Commercial Electronic PA Form

## 2024-03-31 LAB
HIV1 RNA # SERPL NAA+PROBE: NOT DETECTED {COPIES}/ML
SPECIMEN SOURCE: NORMAL

## 2024-04-15 ENCOUNTER — OFFICE VISIT (OUTPATIENT)
Dept: INFECTIOUS DISEASES | Age: 59
End: 2024-04-15
Payer: COMMERCIAL

## 2024-04-15 VITALS
HEIGHT: 66 IN | RESPIRATION RATE: 16 BRPM | TEMPERATURE: 98 F | DIASTOLIC BLOOD PRESSURE: 69 MMHG | SYSTOLIC BLOOD PRESSURE: 121 MMHG | BODY MASS INDEX: 27.16 KG/M2 | WEIGHT: 169 LBS | HEART RATE: 103 BPM

## 2024-04-15 DIAGNOSIS — Z21 ASYMPTOMATIC HIV INFECTION, WITH NO HISTORY OF HIV-RELATED ILLNESS (HCC): Primary | ICD-10-CM

## 2024-04-15 PROCEDURE — 99214 OFFICE O/P EST MOD 30 MIN: CPT | Performed by: INTERNAL MEDICINE

## 2024-04-15 ASSESSMENT — ENCOUNTER SYMPTOMS
EYE PAIN: 0
ABDOMINAL DISTENTION: 0
COLOR CHANGE: 0
APNEA: 0
CHOKING: 0
EYE ITCHING: 0
EYE REDNESS: 0
ABDOMINAL PAIN: 0
DIARRHEA: 0

## 2024-04-15 NOTE — PROGRESS NOTES
[facility] - Initial Consult Note  Today's Date: 4/15/2024    Impression :   HIV 1  from blood transfusion  CD4 421, neg VL, 4/2018  CD4 641, 32%, VL negative, 10/2018  CD4 390, 27%, VL neg 9/2019  CD4 542, 27%, viral load -12/7/2019  CD4 532, 25%, viral load 20-5/2020  CD4 518 -   VL 1.34  logs  - 1/16/21  CD4 495, 27% and VL neg 3/2021  CD4 557, 29% VL not detected 2/2022  CD4 437, 29%- VL 35 count 2/2023  CD4  575 - 32% - VL not detected  CD4 575 - 32% - VL neg 3/2024  Hemophilia a family history-on factor VIII-  persistent spontaneous joint bleeds  Planning for Hemlibra 7/2020 2 stop the spontaneous joint bleeds  Spont bleeds still  4/2024 R knee  and R ankle - despite hemlibra and factor 8  COPD, stable  History hepatitis C, resolved with treatment, VL -2016 - VL neg 9/2019 2010.  Right femoral fracture,  ORIF explantation and reimplantation for MRSA infection. 9 sx - hardware changed twice - No suppression therapy  CKD 1 - creat 1.1-1.3 - 1.3 - 1.4   - 433 - 436 - 452 likely from statins  PPD neg 3/2021 -3/30/22 - neg 2/2022 -3/2023 neg  CXR  10/3/22 atelectasis - 3/2023 pend    UA NEG  9/2023  CXR neg 9/2023  Per PCP -  -hypertrglyceridemia and hypercholesterolemia - fenofibrate and lescol 20  Vaccines:  Hepatitis B vaccine needed many years ago  Pneumovax 23 around 2014 roughly  Prevnar 6/24/2020  DTaP due, defer to outpatient pharmacy  Zoster vaccine will defer to outpatient pharmacy  No hepC exposure  Flu 10/3/22 in office    HAART:  boosted darunavir 800 mg,  maroviroc 150 mg po bid.isentress 400 mg bid.  12/18/2019 switch to Symtuza, Isentress and maraviroc  2/24/21 switch to Kiktarvy as a sole tx  2/2021 started on fluvastatin - need to watch CPK along w integrase inhibitors  9/2021 switched to biktarvy    Recommendations     DUE TO HEMOPHILIA MEDS INTERACTION W THE PI,   9/2021 -Switched and tolerated  Biktarvy which has no PI included in it

## 2024-10-24 ENCOUNTER — HOSPITAL ENCOUNTER (OUTPATIENT)
Dept: GENERAL RADIOLOGY | Age: 59
Discharge: HOME | End: 2024-10-26
Payer: COMMERCIAL

## 2024-10-24 ENCOUNTER — HOSPITAL ENCOUNTER (OUTPATIENT)
Age: 59
Discharge: HOME | End: 2024-10-26
Payer: COMMERCIAL

## 2024-10-24 ENCOUNTER — HOSPITAL ENCOUNTER (OUTPATIENT)
Age: 59
Discharge: HOME | End: 2024-10-24
Payer: COMMERCIAL

## 2024-10-24 DIAGNOSIS — Z21 ASYMPTOMATIC HIV INFECTION, WITH NO HISTORY OF HIV-RELATED ILLNESS (HCC): ICD-10-CM

## 2024-10-24 LAB
25(OH)D3 SERPL-MCNC: 34.3 NG/ML (ref 30–100)
ALBUMIN SERPL-MCNC: 4.5 G/DL (ref 3.5–5.2)
ALBUMIN/GLOB SERPL: 2 {RATIO} (ref 1–2.5)
ALP SERPL-CCNC: 26 U/L (ref 40–129)
ALT SERPL-CCNC: 24 U/L (ref 10–50)
ALT SERPL-CCNC: 25 U/L (ref 10–50)
AMPHET UR QL SCN: NEGATIVE
ANION GAP SERPL CALCULATED.3IONS-SCNC: 10 MMOL/L (ref 9–16)
AST SERPL-CCNC: 28 U/L (ref 10–50)
AST SERPL-CCNC: 29 U/L (ref 10–50)
BARBITURATES UR QL SCN: NEGATIVE
BASOPHILS # BLD: 0.11 K/UL (ref 0–0.2)
BASOPHILS NFR BLD: 2 % (ref 0–2)
BENZODIAZ UR QL: NEGATIVE
BILIRUB SERPL-MCNC: 0.3 MG/DL (ref 0–1.2)
BUN SERPL-MCNC: 33 MG/DL (ref 8–23)
CALCIUM SERPL-MCNC: 9.4 MG/DL (ref 8.6–10.4)
CANNABINOIDS UR QL SCN: NEGATIVE
CHLORIDE SERPL-SCNC: 109 MMOL/L (ref 98–107)
CHOLEST SERPL-MCNC: 193 MG/DL (ref 0–199)
CHOLESTEROL/HDL RATIO: 5
CK SERPL-CCNC: 466 U/L (ref 39–308)
CO2 SERPL-SCNC: 21 MMOL/L (ref 20–31)
COCAINE UR QL SCN: NEGATIVE
CREAT SERPL-MCNC: 1.5 MG/DL (ref 0.7–1.2)
EOSINOPHIL # BLD: 0.57 K/UL (ref 0–0.44)
EOSINOPHILS RELATIVE PERCENT: 10 % (ref 1–4)
ERYTHROCYTE [DISTWIDTH] IN BLOOD BY AUTOMATED COUNT: 13.2 % (ref 11.8–14.4)
FENTANYL UR QL: NEGATIVE
GFR, ESTIMATED: 53 ML/MIN/1.73M2
GLUCOSE SERPL-MCNC: 98 MG/DL (ref 74–99)
HCT VFR BLD AUTO: 41.9 % (ref 40.7–50.3)
HDLC SERPL-MCNC: 41 MG/DL
HGB BLD-MCNC: 14 G/DL (ref 13–17)
IMM GRANULOCYTES # BLD AUTO: 0.08 K/UL (ref 0–0.3)
IMM GRANULOCYTES NFR BLD: 1 %
LDLC SERPL CALC-MCNC: 133 MG/DL (ref 0–100)
LYMPHOCYTES NFR BLD: 1.55 K/UL (ref 1.1–3.7)
LYMPHOCYTES RELATIVE PERCENT: 27 % (ref 24–43)
MCH RBC QN AUTO: 31.2 PG (ref 25.2–33.5)
MCHC RBC AUTO-ENTMCNC: 33.4 G/DL (ref 28.4–34.8)
MCV RBC AUTO: 93.3 FL (ref 82.6–102.9)
METHADONE UR QL: NEGATIVE
MONOCYTES NFR BLD: 0.68 K/UL (ref 0.1–1.2)
MONOCYTES NFR BLD: 12 % (ref 3–12)
NEUTROPHILS NFR BLD: 48 % (ref 36–65)
NEUTS SEG NFR BLD: 2.77 K/UL (ref 1.5–8.1)
NRBC BLD-RTO: 0 PER 100 WBC
OPIATES UR QL SCN: NEGATIVE
OXYCODONE UR QL SCN: NEGATIVE
PCP UR QL SCN: NEGATIVE
PLATELET # BLD AUTO: 215 K/UL (ref 138–453)
PMV BLD AUTO: 11.6 FL (ref 8.1–13.5)
POTASSIUM SERPL-SCNC: 4.8 MMOL/L (ref 3.7–5.3)
PROT SERPL-MCNC: 6.9 G/DL (ref 6.6–8.7)
PSA SERPL-MCNC: 0.3 NG/ML (ref 0–4)
RBC # BLD AUTO: 4.49 M/UL (ref 4.21–5.77)
SODIUM SERPL-SCNC: 140 MMOL/L (ref 136–145)
TEST INFORMATION: NORMAL
TRIGL SERPL-MCNC: 100 MG/DL (ref 0–149)
TSH SERPL DL<=0.05 MIU/L-ACNC: 2.07 UIU/ML (ref 0.27–4.2)
VLDLC SERPL CALC-MCNC: 20 MG/DL
WBC OTHER # BLD: 5.8 K/UL (ref 3.5–11.3)

## 2024-10-24 PROCEDURE — 87536 HIV-1 QUANT&REVRSE TRNSCRPJ: CPT

## 2024-10-24 PROCEDURE — 71046 X-RAY EXAM CHEST 2 VIEWS: CPT

## 2024-10-24 PROCEDURE — 36415 COLL VENOUS BLD VENIPUNCTURE: CPT

## 2024-10-24 PROCEDURE — 82306 VITAMIN D 25 HYDROXY: CPT

## 2024-10-24 PROCEDURE — 84443 ASSAY THYROID STIM HORMONE: CPT

## 2024-10-24 PROCEDURE — 80061 LIPID PANEL: CPT

## 2024-10-24 PROCEDURE — 84460 ALANINE AMINO (ALT) (SGPT): CPT

## 2024-10-24 PROCEDURE — 80307 DRUG TEST PRSMV CHEM ANLYZR: CPT

## 2024-10-24 PROCEDURE — 86361 T CELL ABSOLUTE COUNT: CPT

## 2024-10-24 PROCEDURE — G0103 PSA SCREENING: HCPCS

## 2024-10-24 PROCEDURE — 84450 TRANSFERASE (AST) (SGOT): CPT

## 2024-10-24 PROCEDURE — 82550 ASSAY OF CK (CPK): CPT

## 2024-10-24 PROCEDURE — 85025 COMPLETE CBC W/AUTO DIFF WBC: CPT

## 2024-10-24 PROCEDURE — 80053 COMPREHEN METABOLIC PANEL: CPT

## 2024-10-25 LAB
CD3+CD4+ CELLS # BLD: 454 /UL (ref 309–1571)
CD3+CD4+ CELLS NFR BLD: 29 % (ref 27–64)
HIV1 RNA # SERPL NAA+PROBE: NOT DETECTED {COPIES}/ML
LYMPHOCYTES # BLD: 27 % (ref 24–44)
SPECIMEN SOURCE: NORMAL
WBC # BLD: 5.8 K/UL (ref 3.5–11)

## 2024-11-04 ENCOUNTER — OFFICE VISIT (OUTPATIENT)
Dept: INFECTIOUS DISEASES | Age: 59
End: 2024-11-04
Payer: COMMERCIAL

## 2024-11-04 VITALS
BODY MASS INDEX: 26.52 KG/M2 | HEIGHT: 66 IN | TEMPERATURE: 98 F | SYSTOLIC BLOOD PRESSURE: 117 MMHG | WEIGHT: 165 LBS | HEART RATE: 72 BPM | DIASTOLIC BLOOD PRESSURE: 63 MMHG

## 2024-11-04 DIAGNOSIS — E78.00 HYPERCHOLESTEROLEMIA: ICD-10-CM

## 2024-11-04 DIAGNOSIS — Z21 ASYMPTOMATIC HIV INFECTION, WITH NO HISTORY OF HIV-RELATED ILLNESS (HCC): Primary | ICD-10-CM

## 2024-11-04 PROCEDURE — 90471 IMMUNIZATION ADMIN: CPT | Performed by: INTERNAL MEDICINE

## 2024-11-04 PROCEDURE — 90661 CCIIV3 VAC ABX FR 0.5 ML IM: CPT | Performed by: INTERNAL MEDICINE

## 2024-11-04 PROCEDURE — 99214 OFFICE O/P EST MOD 30 MIN: CPT | Performed by: INTERNAL MEDICINE

## 2024-11-04 RX ORDER — ATORVASTATIN CALCIUM 10 MG/1
10 TABLET, FILM COATED ORAL DAILY
COMMUNITY

## 2024-11-04 ASSESSMENT — ENCOUNTER SYMPTOMS
EYE ITCHING: 0
COLOR CHANGE: 0
CHOKING: 0
ABDOMINAL DISTENTION: 0
EYE PAIN: 0
EYE REDNESS: 0
APNEA: 0
ABDOMINAL PAIN: 0
DIARRHEA: 0

## 2024-11-04 NOTE — PROGRESS NOTES
After obtaining consent, and per orders of Dr. Hollingsworth, injection of Flucevax given in Right deltoid by Zonia Wallace MA. Patient instructed to remain in clinic for 20 minutes afterwards, and to report any adverse reaction to me immediately.    
interact w biktarvy at all    CXR neg  CD 4  495 and 27%  VL neg   and    a little on high side    Exam neg  feels good    No bleed x more than a year  Wife w covid and he did not get it    Visit 3/30/22   in Feb - PCP following erin and TG meds - will repeat CK  CD4 557 and 29%- VL zero  creat 1.3 stable  Still biktarvy - no issues  Exam neg  No bleed x long time (2y) on the new meds of the hemophilia -      Visit 10/3/22  Does well - no issues - hemophilia good status and no bleed  Tolerating biktarvy  PPD neg last time  Exam neg  Stable - lost few pounds on propose  Looking at the labs from [facility], there are some labs that are missing, will reorder them for today.  He did have a Chem-7 that is looking good, liver enzymes normal, lipid profile showing a LDH that is between 101 30, discussed with the patient the importance of dropping below 100 mostly that she has had cholesterol problems.  Otherwise will order a chest x-ray today as well as some labs including CBC CK CD4 and viral load for today and then again a full set of labs for next time, we will see him in 6-month    Will administer today flu shot    3/22/2023 visit  Patient has had quite a few episodes of bleed, despite taking his factor VIII, was told to go on disability which she is working on at this point  His bleed was mostly over the right knee, at this time little more stable  No issues with the Biktarvy, exam is negative, CD4 seems to be on the lower end of the usual but the percentage is the same, see above  Viral load slightly positive for very low count, that has happened also before, patient very regular with his medication, will follow next time With those numbers  So far biktarvy seems to have helped      Visit 9/25/23  CD4 575 and VL neg  Creat 1.3   same as always  UA and CXR neg 9/9/23  Feels great - last bleed in 7/2023  Much less frequent than before  Retired due to increased bleed - now less  On

## 2025-01-29 ENCOUNTER — TELEPHONE (OUTPATIENT)
Dept: INFECTIOUS DISEASES | Age: 60
End: 2025-01-29

## 2025-03-24 RX ORDER — BICTEGRAVIR SODIUM, EMTRICITABINE, AND TENOFOVIR ALAFENAMIDE FUMARATE 50; 200; 25 MG/1; MG/1; MG/1
1 TABLET ORAL DAILY
Qty: 30 TABLET | Refills: 0 | Status: SHIPPED | OUTPATIENT
Start: 2025-03-24

## 2025-03-24 NOTE — TELEPHONE ENCOUNTER
Last appointment: 11/4/24  Next appointment: 5/5/25      Requested Prescriptions     Pending Prescriptions Disp Refills    BIKTARVY -25 MG TABS per tablet [Pharmacy Med Name: Biktarvy Oral Tablet -25 MG] 30 tablet 0     Sig: TAKE 1 TABLET BY MOUTH EVERY DAY       Per last dictation, it is unclear if patient is to continue pended medications. Please review and sign if appropriate. Thank you.

## 2025-04-21 RX ORDER — BICTEGRAVIR SODIUM, EMTRICITABINE, AND TENOFOVIR ALAFENAMIDE FUMARATE 50; 200; 25 MG/1; MG/1; MG/1
1 TABLET ORAL DAILY
Qty: 30 TABLET | Refills: 11 | Status: SHIPPED | OUTPATIENT
Start: 2025-04-21

## 2025-04-21 NOTE — TELEPHONE ENCOUNTER
Last appointment: 11/4/24  Next appointment: 5/5/25  Last fill date: 3/24/25    Requested Prescriptions     Pending Prescriptions Disp Refills    BIKTARVY -25 MG TABS per tablet [Pharmacy Med Name: Biktarvy Oral Tablet -25 MG] 30 tablet 0     Sig: TAKE 1 TABLET BY MOUTH EVERY DAY       Per last dictation, it is unclear if patient is to continue pended medications. Please review and sign if appropriate. Thank you.

## 2025-05-09 LAB
ALBUMIN: 4.6 G/DL
ALP BLD-CCNC: 25 U/L
ALT SERPL-CCNC: 21 U/L
ANION GAP SERPL CALCULATED.3IONS-SCNC: 8 MMOL/L
AST SERPL-CCNC: 25 U/L
BASOPHILS ABSOLUTE: 0.1 /ΜL
BASOPHILS RELATIVE PERCENT: 1.8 %
BILIRUB SERPL-MCNC: 0.5 MG/DL (ref 0.1–1.4)
BILIRUBIN, URINE: NORMAL
BLOOD, URINE: NORMAL
BUN BLDV-MCNC: 36 MG/DL
CALCIUM SERPL-MCNC: 9.8 MG/DL
CHLORIDE BLD-SCNC: 108 MMOL/L
CLARITY, UA: NORMAL
CO2: 20 MMOL/L
COLOR, UA: NORMAL
CREAT SERPL-MCNC: 1.61 MG/DL
EOSINOPHILS ABSOLUTE: 0.6 /ΜL
EOSINOPHILS RELATIVE PERCENT: 11.2 %
GFR, ESTIMATED: ABNORMAL
GLUCOSE BLD-MCNC: 89 MG/DL
GLUCOSE URINE: NORMAL
HCT VFR BLD CALC: 39.7 % (ref 41–53)
HEMOGLOBIN: 13.5 G/DL (ref 13.5–17.5)
KETONES, URINE: NORMAL
LEUKOCYTE ESTERASE, URINE: NORMAL
LYMPHOCYTES ABSOLUTE: 1.6 /ΜL
LYMPHOCYTES RELATIVE PERCENT: 31 %
MCH RBC QN AUTO: 31.4 PG
MCHC RBC AUTO-ENTMCNC: 34.1 G/DL
MCV RBC AUTO: 92 FL
MONOCYTES ABSOLUTE: 0.5 /ΜL
MONOCYTES RELATIVE PERCENT: 10.3 %
NEUTROPHILS ABSOLUTE: 2.3 /ΜL
NEUTROPHILS RELATIVE PERCENT: 45.7 %
NITRITE, URINE: NORMAL
PDW BLD-RTO: 13.6 %
PH UA: NORMAL
PLATELET # BLD: 221 K/ΜL
PMV BLD AUTO: 9.7 FL
POTASSIUM SERPL-SCNC: 5 MMOL/L
PROTEIN UA: NORMAL
RBC # BLD: 4.32 10^6/ΜL
SODIUM BLD-SCNC: 136 MMOL/L
SPECIFIC GRAVITY UA: NORMAL
TOTAL CK: 365 U/L
TOTAL PROTEIN: 7.2 G/DL (ref 6.4–8.2)
UROBILINOGEN, URINE: NORMAL
WBC # BLD: 5 10^3/ML

## 2025-05-12 DIAGNOSIS — E78.00 HYPERCHOLESTEROLEMIA: ICD-10-CM

## 2025-05-12 DIAGNOSIS — Z21 ASYMPTOMATIC HIV INFECTION, WITH NO HISTORY OF HIV-RELATED ILLNESS (HCC): ICD-10-CM

## 2025-05-13 DIAGNOSIS — E78.00 HYPERCHOLESTEROLEMIA: ICD-10-CM

## 2025-05-13 DIAGNOSIS — Z21 ASYMPTOMATIC HIV INFECTION, WITH NO HISTORY OF HIV-RELATED ILLNESS (HCC): ICD-10-CM

## 2025-05-21 ENCOUNTER — RESULTS FOLLOW-UP (OUTPATIENT)
Dept: INFECTIOUS DISEASES | Age: 60
End: 2025-05-21

## 2025-06-11 ENCOUNTER — OFFICE VISIT (OUTPATIENT)
Dept: INFECTIOUS DISEASES | Age: 60
End: 2025-06-11
Payer: COMMERCIAL

## 2025-06-11 VITALS
SYSTOLIC BLOOD PRESSURE: 111 MMHG | OXYGEN SATURATION: 98 % | WEIGHT: 167 LBS | HEIGHT: 66 IN | DIASTOLIC BLOOD PRESSURE: 57 MMHG | TEMPERATURE: 98.2 F | BODY MASS INDEX: 26.84 KG/M2 | HEART RATE: 75 BPM

## 2025-06-11 DIAGNOSIS — Z21 ASYMPTOMATIC HIV INFECTION, WITH NO HISTORY OF HIV-RELATED ILLNESS (HCC): Primary | ICD-10-CM

## 2025-06-11 DIAGNOSIS — E78.00 HYPERCHOLESTEROLEMIA: ICD-10-CM

## 2025-06-11 PROCEDURE — 99214 OFFICE O/P EST MOD 30 MIN: CPT | Performed by: INTERNAL MEDICINE

## 2025-06-11 PROCEDURE — G2211 COMPLEX E/M VISIT ADD ON: HCPCS | Performed by: INTERNAL MEDICINE

## 2025-06-11 ASSESSMENT — ENCOUNTER SYMPTOMS
CHOKING: 0
EYE REDNESS: 0
COLOR CHANGE: 0
DIARRHEA: 0
APNEA: 0
EYE PAIN: 0
ABDOMINAL DISTENTION: 0
ABDOMINAL PAIN: 0
EYE ITCHING: 0

## 2025-06-11 NOTE — PROGRESS NOTES
integrase inhibitors  9/2021 switched to biktarvy    Recommendations     DUE TO HEMOPHILIA MEDS INTERACTION W THE PI,   9/2021 -Switched and tolerated  Biktarvy which has no PI included in it and for better control of the hemophilia and the bleed-  we will also be able to give him proton pump inhibitors for his reflux wo concerns.  See in 6 months w  HIV labs   See me in 6 m,ont w labs again -  Get extra CPK now since last was a little high - though PCP managing the cholest meds  PPD neg 2022-repeat 3/22/2023, wife will remove it at home, she is a nurse - get quantiferon next visit   CXR  in 2024  Urine analysis  in 2024  Follow with nephrologist - creat 1.3 now  Zoster vaccine will defer to outpatient pharmacy anytime now  9/25/23  Flu shot today  See me 6 months w usual labs    4/15/24  Erin still high on fluvastatin and erin / HDL ratio  is 6 high  Suggest lipitor 20 instead of lescol - defer to PCP  Creat 1.4 stable  CD4 575 - 32% - VL neg 3/2024  Sees Dr Ranker for kid  See me in 6  months w CXR and same labs  Keeps biktarvy      11/4/24  Flu shot today  Next year prevnar 20  CD4 454 - 29% and VL negative  Creat 1.5 same  Eosino 10% stable old  CPK same range 466   Exam neg  R knee still spontaneous bleed      Visit 6/11/25  CD4 419 - VL neg - creat 1.6  -  same old range -lipids ok  - AST ALT ok -  On biktarvy and on lipitor  and likes crestor - ok for either- he is hoping to improve the CPK levels -  Bleeds in ankles  and getting factor 8 shots iv  Exam neg  Next year prevnar 20 - 11/2025  See me in 6  months - same labs       Diagnosis Orders   1. Asymptomatic HIV infection, with no history of HIV-related illness (HCC)  CBC with Auto Differential    Comprehensive Metabolic Panel    CD4 Percent and Absolute    HIV RNA, Quantitative, PCR    CK      2. Hypercholesterolemia                Return in about 6 months (around 12/11/2025).      History of Present Illness:   Thang Chaudhry is a 59 
Alert and oriented to person, place and time